# Patient Record
Sex: FEMALE | Race: WHITE | NOT HISPANIC OR LATINO | Employment: OTHER | URBAN - METROPOLITAN AREA
[De-identification: names, ages, dates, MRNs, and addresses within clinical notes are randomized per-mention and may not be internally consistent; named-entity substitution may affect disease eponyms.]

---

## 2019-06-26 PROBLEM — Z92.89 HISTORY OF CARDIAC MONITORING: Status: ACTIVE | Noted: 2019-06-26

## 2019-06-26 PROBLEM — Z92.89 H/O PULMONARY FUNCTION TESTS: Status: ACTIVE | Noted: 2019-06-26

## 2019-06-26 PROBLEM — Z98.890 HISTORY OF CARDIOVERSION: Status: ACTIVE | Noted: 2019-06-26

## 2019-06-26 PROBLEM — R00.2 PALPITATIONS: Status: ACTIVE | Noted: 2019-06-26

## 2019-06-26 PROBLEM — R42 ORTHOSTATIC DIZZINESS: Status: ACTIVE | Noted: 2019-06-26

## 2019-06-26 PROBLEM — R07.89 CHEST PAIN, ATYPICAL: Status: ACTIVE | Noted: 2019-06-26

## 2019-06-26 PROBLEM — I34.0 MITRAL VALVE REGURGITATION: Status: ACTIVE | Noted: 2019-06-26

## 2019-06-26 PROBLEM — Z98.890 HX OF CARDIAC CATHETERIZATION: Status: ACTIVE | Noted: 2019-06-26

## 2019-06-26 PROBLEM — Z92.89 HISTORY OF CARDIOVERSION: Status: ACTIVE | Noted: 2019-06-26

## 2019-06-26 PROBLEM — E66.9 OBESITY: Status: ACTIVE | Noted: 2019-06-26

## 2019-06-26 PROBLEM — Z92.89 HISTORY OF ECHOCARDIOGRAM: Status: ACTIVE | Noted: 2019-06-26

## 2019-06-27 ENCOUNTER — OFFICE VISIT (OUTPATIENT)
Dept: CARDIOLOGY | Facility: CLINIC | Age: 64
End: 2019-06-27

## 2019-06-27 VITALS
WEIGHT: 233 LBS | BODY MASS INDEX: 37.45 KG/M2 | SYSTOLIC BLOOD PRESSURE: 110 MMHG | OXYGEN SATURATION: 96 % | HEART RATE: 100 BPM | DIASTOLIC BLOOD PRESSURE: 68 MMHG | HEIGHT: 66 IN

## 2019-06-27 DIAGNOSIS — I48.91 ATRIAL FIBRILLATION, UNSPECIFIED TYPE (HCC): Primary | ICD-10-CM

## 2019-06-27 DIAGNOSIS — E66.09 CLASS 2 OBESITY DUE TO EXCESS CALORIES WITHOUT SERIOUS COMORBIDITY WITH BODY MASS INDEX (BMI) OF 37.0 TO 37.9 IN ADULT: ICD-10-CM

## 2019-06-27 DIAGNOSIS — R00.2 PALPITATIONS: ICD-10-CM

## 2019-06-27 DIAGNOSIS — I34.0 MITRAL VALVE INSUFFICIENCY, UNSPECIFIED ETIOLOGY: ICD-10-CM

## 2019-06-27 PROCEDURE — 99214 OFFICE O/P EST MOD 30 MIN: CPT | Performed by: INTERNAL MEDICINE

## 2019-06-27 RX ORDER — BUDESONIDE AND FORMOTEROL FUMARATE DIHYDRATE 160; 4.5 UG/1; UG/1
2 AEROSOL RESPIRATORY (INHALATION) AS NEEDED
COMMUNITY
Start: 2019-04-26

## 2019-06-27 RX ORDER — LEVETIRACETAM 500 MG/1
1000 TABLET ORAL EVERY EVENING
Refills: 3 | COMMUNITY
Start: 2019-04-18

## 2019-06-27 NOTE — PROGRESS NOTES
[facility] SPECIALISTS        Subjective:     Encounter Date:06/27/2019      Patient ID: Meena Cesar is a 63 y.o. female.      HPI: I saw Meena Cesar today for continued cardiac care.  She is a pleasant 63-year-old female who is maintained her activity level.  She denies chest pain pressure tightness she does have some dyspnea on exertion but this is not progressive.  She is free of syncope near syncope but does report occasional palpitations.  These are moderately symptomatic.  On ambulatory EKG from 2/21/2019 she demonstrated atrial flutter unroofed percent burden ranging from 49 to 213 bpm with average of 88 bpm.  She is undergone coronary angiography in 2016 demonstrating normal left ventricular function normal coronary arteries.  Echocardiogram February this year reveals preserved left ventricular function mild concentric left ventricular hypertrophy mild mitral tricuspid insufficiency.  She is obtain reasonable control of her atrial flutter and fibrillation with amiodarone and beta-blocker therapy.  She is on long-term anticoagulation with Coumadin.      The following portions of the patient's history were reviewed and updated as appropriate: allergies, current medications, past family history, past medical history, past social history, past surgical history and problem list.    Problem List:  Patient Active Problem List   Diagnosis   • A-fib (CMS/[facility])   • Obesity   • Mitral valve regurgitation   • Orthostatic dizziness   • Chest pain, atypical   • Palpitations   • History of cardioversion   • History of echocardiogram   • H/O pulmonary function tests   • Hx of cardiac catheterization   • History of cardiac monitoring       Past Medical History:  No past medical history on file.    Past Surgical History:  No past surgical history on file.    Social History:  Social History     Socioeconomic History   • Marital status:      Spouse name: Not on file   • Number of children: Not on file  "  • Years of education: Not on file   • Highest education level: Not on file   Tobacco Use   • Smoking status: Never Smoker       Allergies:  No Known Allergies      ROS:  Review of Systems   Constitution: Negative for weakness and malaise/fatigue.   HENT: Negative for hearing loss and nosebleeds.    Eyes: Negative for double vision and visual disturbance.   Cardiovascular: Positive for dyspnea on exertion and palpitations. Negative for chest pain, claudication, near-syncope, orthopnea, paroxysmal nocturnal dyspnea and syncope.   Respiratory: Negative for cough, shortness of breath, sleep disturbances due to breathing, snoring, sputum production and wheezing.    Endocrine: Negative for cold intolerance, heat intolerance, polydipsia and polyuria.   Hematologic/Lymphatic: Negative for adenopathy and bleeding problem. Does not bruise/bleed easily.   Skin: Negative for flushing and itching.   Musculoskeletal: Negative for back pain, falls, joint pain, joint swelling, muscle weakness and neck pain.   Gastrointestinal: Negative for abdominal pain, dysphagia, heartburn, nausea and vomiting.   Genitourinary: Negative for dysuria and frequency.   Neurological: Negative for disturbances in coordination, dizziness, light-headedness, loss of balance and numbness.   Psychiatric/Behavioral: Negative for altered mental status and memory loss. The patient is not nervous/anxious.    Allergic/Immunologic: Negative for hives and persistent infections.          Objective:         /68 (BP Location: Right arm, Patient Position: Sitting, Cuff Size: Large Adult)   Pulse 100   Ht 167.6 cm (66\")   Wt 106 kg (233 lb)   SpO2 96%   BMI 37.61 kg/m²     Physical Exam   Constitutional: She is oriented to person, place, and time. She appears well-developed and well-nourished.   HENT:   Head: Normocephalic and atraumatic.   Eyes: Conjunctivae and EOM are normal. Pupils are equal, round, and reactive to light.   Neck: Neck supple. No " thyromegaly present.   Cardiovascular: Normal rate, regular rhythm, S1 normal, S2 normal and intact distal pulses. PMI is not displaced. Exam reveals gallop and S4. Exam reveals no S3, no distant heart sounds, no friction rub, no midsystolic click and no opening snap.   No murmur heard.  Pulses:       Carotid pulses are 2+ on the right side, and 2+ on the left side.       Radial pulses are 2+ on the right side, and 2+ on the left side.        Femoral pulses are 2+ on the right side, and 2+ on the left side.       Dorsalis pedis pulses are 2+ on the right side, and 2+ on the left side.        Posterior tibial pulses are 2+ on the right side, and 2+ on the left side.   1/6 systolic murmur left sternal border   Pulmonary/Chest: Effort normal and breath sounds normal. No respiratory distress. She has no wheezes. She has no rales.   Abdominal: Soft. Bowel sounds are normal. She exhibits no distension and no mass. There is no tenderness.   Musculoskeletal: Normal range of motion. She exhibits no edema.   Neurological: She is alert and oriented to person, place, and time.   Skin: Skin is warm and dry. No erythema.   Psychiatric: She has a normal mood and affect.       In-Office Procedure(s):  Procedures    ASCVD RIsk Score::  The ASCVD Risk score ([place] [facility]E Jr., et al., 2013) failed to calculate for the following reasons:    Cannot find a previous HDL lab    Cannot find a previous total cholesterol lab        Assessment/Plan:         1. Atrial fibrillation flutter/, unspecified type (CMS/HCC)  Moderately symptomatic on chronic amiodarone and anticoagulation    2. Mitral valve insufficiency, unspecified etiology  Compensated    3. Palpitations  Moderately symptomatic    4. Class 2 obesity due to excess calories without serious comorbidity with body mass index (BMI) of 37.0 to 37.9 in adult  Encouraged low-cholesterol low saturated fat weight reduction diet and increase activity level    I feel she will is free of angina and  appears near euvolemic.  She is I feel moderately symptomatic from her atrial paroxysmal dysrhythmias.  She requires amiodarone for control on a chronic basis.  She is tolerating long-term anticoagulation.  I strongly recommended to her she consider ablation to reduce her symptoms and potentially discontinue amiodarone.  She is agreeable and I will make arrangements for her to see Dr. Geoff Calle, electrophysiologist.  I spent greater than 30 minutes with face-to-face time with Ms. Cesar, of which greater than 50% was in counseling in regards to the above.  I will see her in follow-up within 3 months         Ajay Hathaway MD  06/27/19  .

## 2019-07-11 ENCOUNTER — TELEPHONE (OUTPATIENT)
Dept: CARDIOLOGY | Facility: CLINIC | Age: 64
End: 2019-07-11

## 2019-07-11 ENCOUNTER — OFFICE VISIT (OUTPATIENT)
Dept: CARDIOLOGY | Facility: CLINIC | Age: 64
End: 2019-07-11

## 2019-07-11 VITALS
DIASTOLIC BLOOD PRESSURE: 89 MMHG | HEIGHT: 66 IN | BODY MASS INDEX: 38.18 KG/M2 | SYSTOLIC BLOOD PRESSURE: 115 MMHG | WEIGHT: 237.6 LBS

## 2019-07-11 DIAGNOSIS — I48.19 PERSISTENT ATRIAL FIBRILLATION (HCC): Primary | ICD-10-CM

## 2019-07-11 DIAGNOSIS — I48.92 ATRIAL FLUTTER WITH CONTROLLED RESPONSE (HCC): ICD-10-CM

## 2019-07-11 PROCEDURE — 99204 OFFICE O/P NEW MOD 45 MIN: CPT | Performed by: INTERNAL MEDICINE

## 2019-07-11 NOTE — PROGRESS NOTES
Subjective:     Encounter Date:07/11/2019      Patient ID: Meena Cesar is a 63 y.o. female.    Chief Complaint:  Chief Complaint   Patient presents with   • Follow-up     possbile atrial flutter ablation       HPI:  Patient is referred for evaluation of atrial fibrillation and flutter.    Ms Cesar is a 62 yo patient of Dr. Ajay Hathaway who has a past medical history significant for mild mitral regurgitation.  She has had atrial fibrillation for about 3-4 years and has been on amiodarone since then.  Within the past several months she has noted an increase in her palpitations which she describes as an uncomfortable feeling in her chest associated with dyspnea and fatigue.  Her symptoms are now a daily occurrence.  Her dyspnea is relieved with rest.  There is no associated chest pain, PND, orthopnea or peripheral edema.    She was found to be in atrial flutter on her EKG last month and an ambulatory EKG from 2/19 showed atrial flutter with rates ranging between 49-213bpm.    A cath in 22016 showed normal coronaries and an echo 2/19 showed mild concentric LVH with an EF of 55% and mild MR.    She is concerned about long term effects of chronic amiodarone use.      The following portions of the patient's history were reviewed and updated as appropriate: allergies, current medications, past family history, past medical history, past social history, past surgical history and problem list.    Problem List:  Patient Active Problem List   Diagnosis   • A-fib (CMS/HCC)   • Obesity   • Mitral valve regurgitation   • Orthostatic dizziness   • Chest pain, atypical   • Palpitations   • History of cardioversion   • History of echocardiogram   • H/O pulmonary function tests   • Hx of cardiac catheterization   • History of cardiac monitoring   • Atrial flutter with controlled response (CMS/HCC)       Past Medical History:  History reviewed. No pertinent past medical history.    Past Surgical History:  History  "reviewed. No pertinent surgical history.    Social History:  Social History     Socioeconomic History   • Marital status:      Spouse name: Not on file   • Number of children: Not on file   • Years of education: Not on file   • Highest education level: Not on file   Tobacco Use   • Smoking status: Never Smoker   • Smokeless tobacco: Never Used   Substance and Sexual Activity   • Alcohol use: No     Frequency: Never       Allergies:  No Known Allergies    Immunizations:    There is no immunization history on file for this patient.    ROS:  Review of Systems   Constitution: Positive for malaise/fatigue.   HENT: Negative.    Eyes: Negative.    Cardiovascular: Positive for dyspnea on exertion, irregular heartbeat and palpitations. Negative for chest pain and orthopnea.   Respiratory: Positive for shortness of breath.    Endocrine: Negative.    Hematologic/Lymphatic: Negative.    Skin: Negative.    Musculoskeletal: Positive for arthritis and joint pain.   Gastrointestinal: Negative.    Genitourinary: Negative.    Neurological: Negative.    Psychiatric/Behavioral: Negative.    Allergic/Immunologic: Negative.           Objective:         /89   Ht 167.6 cm (66\")   Wt 108 kg (237 lb 9.6 oz)   BMI 38.35 kg/m²     Physical Exam   Constitutional: She is oriented to person, place, and time. She appears well-developed and well-nourished. No distress.   HENT:   Head: Normocephalic and atraumatic.   Eyes: Conjunctivae and EOM are normal. Pupils are equal, round, and reactive to light. No scleral icterus.   Neck: Normal range of motion. No thyromegaly present.   Cardiovascular: Normal rate, regular rhythm and normal heart sounds.   Pulmonary/Chest: Effort normal and breath sounds normal.   Abdominal: Soft. Bowel sounds are normal.   Musculoskeletal: Normal range of motion.   Neurological: She is alert and oriented to person, place, and time.   Skin: Skin is warm and dry.   Psychiatric: She has a normal mood and " affect.       In-Office Procedure(s):    ECG 12 Lead  Date/Time: 7/14/2019 7:56 AM  Performed by: Gary Calle MD  Authorized by: Gary Calle MD   Comparison: not compared with previous ECG   Previous ECG: no previous ECG available  Rhythm: atrial flutter  Rate: normal    Clinical impression: abnormal EKG            ASCVD RIsk Score::  The ASCVD Risk score ([place]oe Bayflavio MCCLOUD Jr., et al., 2013) failed to calculate for the following reasons:    Cannot find a previous HDL lab    Cannot find a previous total cholesterol lab    Recent Radiology:  Imaging Results (most recent)     None          Lab Review:   No visits with results within 6 Month(s) from this visit.   Latest known visit with results is:   No results found for any previous visit.              Invalid input(s): ALKPO4                        Invalid input(s): LDLCALC                Assessment:          Diagnosis Plan   1. Persistent atrial fibrillation (CMS/HCC)     2. Atrial flutter with controlled response (CMS/HCC)            Plan:         1. Atrial flutter and atrial fibrillation - currently she is in flutter with symptoms of dyspnea, fatigue and poor endurance.  Discussed options of alternative antiarrhythmic drug therapy with dofetilide vs ablation with the associated risks and benefits of each.  She is leaning toward ablation but would like to give it further consideration.  She will call us when she has made her decision.    Level of Care:                 Gary Calle MD  07/11/19  .

## 2019-07-14 PROBLEM — I48.92 ATRIAL FLUTTER WITH CONTROLLED RESPONSE (HCC): Status: ACTIVE | Noted: 2019-07-14

## 2019-07-14 PROCEDURE — 93000 ELECTROCARDIOGRAM COMPLETE: CPT | Performed by: INTERNAL MEDICINE

## 2019-07-16 DIAGNOSIS — I48.91 ATRIAL FIBRILLATION, UNSPECIFIED TYPE (HCC): Primary | ICD-10-CM

## 2019-08-05 ENCOUNTER — TELEPHONE (OUTPATIENT)
Dept: CARDIOLOGY | Facility: CLINIC | Age: 64
End: 2019-08-05

## 2019-08-05 NOTE — TELEPHONE ENCOUNTER
Called pt. To give her date and time for her a-fib ablation. She states that after thinking about it she has decided not to have this done. I told her that if she changes her mind to please give me a call,

## 2019-09-26 ENCOUNTER — OFFICE VISIT (OUTPATIENT)
Dept: CARDIOLOGY | Facility: CLINIC | Age: 64
End: 2019-09-26

## 2019-09-26 VITALS
DIASTOLIC BLOOD PRESSURE: 64 MMHG | HEART RATE: 96 BPM | OXYGEN SATURATION: 95 % | SYSTOLIC BLOOD PRESSURE: 96 MMHG | WEIGHT: 237 LBS | BODY MASS INDEX: 38.09 KG/M2 | HEIGHT: 66 IN

## 2019-09-26 DIAGNOSIS — E66.09 CLASS 2 OBESITY DUE TO EXCESS CALORIES WITHOUT SERIOUS COMORBIDITY WITH BODY MASS INDEX (BMI) OF 37.0 TO 37.9 IN ADULT: ICD-10-CM

## 2019-09-26 DIAGNOSIS — R00.2 PALPITATIONS: ICD-10-CM

## 2019-09-26 DIAGNOSIS — R07.89 CHEST PAIN, ATYPICAL: ICD-10-CM

## 2019-09-26 DIAGNOSIS — I48.0 PAROXYSMAL ATRIAL FIBRILLATION (HCC): ICD-10-CM

## 2019-09-26 DIAGNOSIS — Z79.01 CURRENT USE OF LONG TERM ANTICOAGULATION: ICD-10-CM

## 2019-09-26 DIAGNOSIS — I48.92 ATRIAL FLUTTER WITH CONTROLLED RESPONSE (HCC): Primary | ICD-10-CM

## 2019-09-26 DIAGNOSIS — I34.0 MITRAL VALVE INSUFFICIENCY, UNSPECIFIED ETIOLOGY: ICD-10-CM

## 2019-09-26 PROCEDURE — 99214 OFFICE O/P EST MOD 30 MIN: CPT | Performed by: INTERNAL MEDICINE

## 2019-09-26 NOTE — PROGRESS NOTES
\[facility]\"" [facility]        Subjective:     Encounter Date:09/26/2019      Patient ID: Meena Cesar is a 64 y.o. female.      HPI: I saw Meena Cesar today for continued cardiovascular care.  Ms. Cesar is a pleasant 63-year-old female who is in good spirits.  She denies chest pain pressure tightness suggestive of angina.  Her dyspnea on exertion remains stable there is no orthopnea or PND no syncope or near syncope.  She reports occasional palpitations which overall are well-tolerated.  She is known to have atrial flutter and fibrillation and remains on amiodarone but beta-blocker therapy and long-term anticoagulation she underwent evaluation by Dr. Geoff Calle for antiarrhythmic adjustment or ablation.  At present time she feels her symptoms are minimal and well-tolerated, she wishes to continue her current regimen.      The following portions of the patient's history were reviewed and updated as appropriate: allergies, current medications, past family history, past medical history, past social history, past surgical history and problem list.    Problem List:  Patient Active Problem List   Diagnosis   • A-fib (CMS/[facility])   • Obesity   • Mitral valve regurgitation   • Orthostatic dizziness   • Chest pain, atypical   • Palpitations   • History of cardioversion   • History of echocardiogram   • H/O pulmonary function tests   • Hx of cardiac catheterization   • History of cardiac monitoring   • Atrial flutter with controlled response (CMS/[facility])   • Current use of long term anticoagulation       Past Medical History:  No past medical history on file.    Past Surgical History:  No past surgical history on file.    Social History:  Social History     Socioeconomic History   • Marital status:      Spouse name: Not on file   • Number of children: Not on file   • Years of education: Not on file   • Highest education level: Not on file   Tobacco Use   • Smoking status: Never Smoker   • Smokeless  "tobacco: Never Used   Substance and Sexual Activity   • Alcohol use: No     Frequency: Never       Allergies:  No Known Allergies      ROS:  Review of Systems   Constitution: Negative for weakness and malaise/fatigue.   HENT: Negative for hearing loss and nosebleeds.    Eyes: Negative for double vision and visual disturbance.   Cardiovascular: Positive for dyspnea on exertion and palpitations. Negative for chest pain, claudication, near-syncope, orthopnea, paroxysmal nocturnal dyspnea and syncope.   Respiratory: Negative for cough, shortness of breath, sleep disturbances due to breathing, snoring, sputum production and wheezing.    Endocrine: Negative for cold intolerance, heat intolerance, polydipsia and polyuria.   Hematologic/Lymphatic: Negative for adenopathy and bleeding problem. Does not bruise/bleed easily.   Skin: Negative for flushing and itching.   Musculoskeletal: Negative for back pain, falls, joint pain, joint swelling, muscle weakness and neck pain.   Gastrointestinal: Negative for abdominal pain, dysphagia, heartburn, nausea and vomiting.   Genitourinary: Negative for dysuria and frequency.   Neurological: Negative for disturbances in coordination, dizziness, light-headedness, loss of balance and numbness.   Psychiatric/Behavioral: Negative for altered mental status and memory loss. The patient is not nervous/anxious.    Allergic/Immunologic: Negative for hives and persistent infections.          Objective:         BP 96/64 (BP Location: Right arm, Patient Position: Sitting, Cuff Size: Large Adult)   Pulse 96   Ht 167.6 cm (66\")   Wt 108 kg (237 lb)   SpO2 95%   BMI 38.25 kg/m²     Physical Exam   Constitutional: She is oriented to person, place, and time. She appears well-developed and well-nourished.   HENT:   Head: Normocephalic and atraumatic.   Eyes: Conjunctivae and EOM are normal. Pupils are equal, round, and reactive to light.   Neck: Neck supple. No thyromegaly present.   Cardiovascular: " Normal rate, regular rhythm, S1 normal, S2 normal and intact distal pulses. PMI is not displaced. Exam reveals gallop and S4. Exam reveals no S3, no distant heart sounds, no friction rub, no midsystolic click and no opening snap.   No murmur heard.  Pulses:       Carotid pulses are 2+ on the right side, and 2+ on the left side.       Radial pulses are 2+ on the right side, and 2+ on the left side.        Femoral pulses are 2+ on the right side, and 2+ on the left side.       Dorsalis pedis pulses are 2+ on the right side, and 2+ on the left side.        Posterior tibial pulses are 2+ on the right side, and 2+ on the left side.   1/6 systolic murmur left sternal border   Pulmonary/Chest: Effort normal and breath sounds normal. No respiratory distress. She has no wheezes. She has no rales.   Abdominal: Soft. Bowel sounds are normal. She exhibits no distension and no mass. There is no tenderness.   Musculoskeletal: Normal range of motion. She exhibits no edema.   Neurological: She is alert and oriented to person, place, and time.   Skin: Skin is warm and dry. No erythema.   Psychiatric: She has a normal mood and affect.       In-Office Procedure(s):  Procedures    ASCVD RIsk Score::  The ASCVD Risk score ([place] [facility] Jr., et al., 2013) failed to calculate for the following reasons:    Cannot find a previous HDL lab    Cannot find a previous total cholesterol lab        Assessment/Plan:         1. Atrial flutter with controlled response (CMS/HCC)  Tolerable    2. Paroxysmal atrial fibrillation (CMS/HCC)  Stable    3. Palpitations  Tolerable    4. Current use of long term anticoagulation  Well-tolerated    5. Mitral valve insufficiency, unspecified etiology  Stable    6. Class 2 obesity due to excess calories without serious comorbidity with body mass index (BMI) of 37.0 to 37.9 in adult  Persistent    7. Chest pain, atypical  Resolved    Ms. Cesar is stable with minimal symptoms secondary to her atrial  flutter/fibrillation.  As indicated above she is decided to continue her current medical regimen.  Therefore may no changes.  Of encouraged her to advance her activity level and observe a low-cholesterol low saturated fat weight reduction diet.  I will see her in follow-up in 3 months sooner if needed.  She will contact me if there is any increase in the frequency durations or symptoms associated with her atrial dysrhythmias.           Ajay Hathaway MD  09/26/19  .

## 2020-01-16 ENCOUNTER — OFFICE VISIT (OUTPATIENT)
Dept: CARDIOLOGY | Facility: CLINIC | Age: 65
End: 2020-01-16

## 2020-01-16 VITALS
SYSTOLIC BLOOD PRESSURE: 110 MMHG | WEIGHT: 242.7 LBS | DIASTOLIC BLOOD PRESSURE: 78 MMHG | HEART RATE: 94 BPM | HEIGHT: 66 IN | OXYGEN SATURATION: 99 % | BODY MASS INDEX: 39.01 KG/M2

## 2020-01-16 DIAGNOSIS — I48.92 ATRIAL FLUTTER WITH CONTROLLED RESPONSE (HCC): ICD-10-CM

## 2020-01-16 DIAGNOSIS — I34.0 MITRAL VALVE INSUFFICIENCY, UNSPECIFIED ETIOLOGY: ICD-10-CM

## 2020-01-16 DIAGNOSIS — E66.09 CLASS 2 OBESITY DUE TO EXCESS CALORIES WITHOUT SERIOUS COMORBIDITY WITH BODY MASS INDEX (BMI) OF 37.0 TO 37.9 IN ADULT: ICD-10-CM

## 2020-01-16 DIAGNOSIS — I48.0 PAROXYSMAL ATRIAL FIBRILLATION (HCC): Primary | ICD-10-CM

## 2020-01-16 DIAGNOSIS — R00.2 PALPITATIONS: ICD-10-CM

## 2020-01-16 DIAGNOSIS — Z79.01 CURRENT USE OF LONG TERM ANTICOAGULATION: ICD-10-CM

## 2020-01-16 PROCEDURE — 99213 OFFICE O/P EST LOW 20 MIN: CPT | Performed by: INTERNAL MEDICINE

## 2020-01-18 NOTE — PROGRESS NOTES
[facility]ART SPECIAL[facility]        Subjective:     Encounter Date:01/16/2020      Patient ID: Meena Cesar is a 64 y.o. female.      HPI: I saw Meena Cesar today for cardiovascular care.  She is a very pleasant 64-year-old female with a decreased activity level secondary to right knee pain.  She uses a cane for ambulation.  She reports rare palpitations.  She has a history of atrial flutter and fibrillation and remains on long-term amiodarone with Coumadin anticoagulation.  She otherwise feels well free of chest pain pressure tightness.  No significant dyspnea on exertion.  She remains free of orthopnea or PND syncope or near syncope.  She tolerates her anticoagulation well no significant bruising or bleeding.  Overall she is doing well feels well remains active.    Lipids are monitored by Dr. Martinez and remain well controlled.      The following portions of the patient's history were reviewed and updated as appropriate: allergies, current medications, past family history, past medical history, past social history, past surgical history and problem list.    Problem List:  Patient Active Problem List   Diagnosis   • A-fib (CMS/[facility])   • Obesity   • Mitral valve regurgitation   • Orthostatic dizziness   • Chest pain, atypical   • Palpitations   • History of cardioversion   • History of echocardiogram   • H/O pulmonary function tests   • Hx of cardiac catheterization   • History of cardiac monitoring   • Atrial flutter with controlled response (CMS/[facility])   • Current use of long term anticoagulation       Past Medical History:  No past medical history on file.    Past Surgical History:  No past surgical history on file.    Social History:  Social History     Socioeconomic History   • Marital status:      Spouse name: Not on file   • Number of children: Not on file   • Years of education: Not on file   • Highest education level: Not on file   Tobacco Use   • Smoking status: Never Smoker   • Smokeless  "tobacco: Never Used   Substance and Sexual Activity   • Alcohol use: No     Frequency: Never       Allergies:  No Known Allergies      ROS:  Review of Systems   Constitution: Negative for malaise/fatigue.   HENT: Negative for hearing loss and nosebleeds.    Eyes: Negative for double vision and visual disturbance.   Cardiovascular: Positive for palpitations. Negative for chest pain, claudication, dyspnea on exertion, near-syncope, orthopnea, paroxysmal nocturnal dyspnea and syncope.   Respiratory: Negative for cough, shortness of breath, sleep disturbances due to breathing, snoring, sputum production and wheezing.    Endocrine: Negative for cold intolerance, heat intolerance, polydipsia and polyuria.   Hematologic/Lymphatic: Negative for adenopathy and bleeding problem. Does not bruise/bleed easily.   Skin: Negative for flushing and itching.   Musculoskeletal: Negative for back pain, falls, joint pain, joint swelling, muscle weakness and neck pain.   Gastrointestinal: Negative for abdominal pain, dysphagia, heartburn, nausea and vomiting.   Genitourinary: Negative for dysuria and frequency.   Neurological: Negative for disturbances in coordination, dizziness, light-headedness, loss of balance, numbness and weakness.   Psychiatric/Behavioral: Negative for altered mental status and memory loss. The patient is not nervous/anxious.    Allergic/Immunologic: Negative for hives and persistent infections.          Objective:         /78 (BP Location: Right arm, Patient Position: Sitting, Cuff Size: Large Adult)   Pulse 94   Ht 167.6 cm (66\")   Wt 110 kg (242 lb 11.2 oz)   SpO2 99%   BMI 39.17 kg/m²     Physical Exam   Constitutional: She is oriented to person, place, and time. She appears well-developed and well-nourished.   HENT:   Head: Normocephalic and atraumatic.   Eyes: Pupils are equal, round, and reactive to light. Conjunctivae and EOM are normal.   Neck: Neck supple. No thyromegaly present. "   Cardiovascular: Normal rate, regular rhythm, S1 normal, S2 normal and intact distal pulses. PMI is not displaced. Exam reveals gallop and S4. Exam reveals no S3, no distant heart sounds, no friction rub, no midsystolic click and no opening snap.   No murmur heard.  Pulses:       Carotid pulses are 2+ on the right side, and 2+ on the left side.       Radial pulses are 2+ on the right side, and 2+ on the left side.        Femoral pulses are 2+ on the right side, and 2+ on the left side.       Dorsalis pedis pulses are 2+ on the right side, and 2+ on the left side.        Posterior tibial pulses are 2+ on the right side, and 2+ on the left side.   1/6 systolic murmur left sternal border   Pulmonary/Chest: Effort normal and breath sounds normal. No respiratory distress. She has no wheezes. She has no rales.   Abdominal: Soft. Bowel sounds are normal. She exhibits no distension and no mass. There is no tenderness.   Musculoskeletal: Normal range of motion. She exhibits no edema.   Neurological: She is alert and oriented to person, place, and time.   Skin: Skin is warm and dry. No erythema.   Psychiatric: She has a normal mood and affect.       In-Office Procedure(s):  Procedures    ASCVD RIsk Score::  The ASCVD Risk score (Sintia DC Jr., et al., 2013) failed to calculate for the following reasons:    Cannot find a previous HDL lab    Cannot find a previous total cholesterol lab        Assessment/Plan:         1. Paroxysmal atrial fibrillation (CMS/HCC)  Stable    2. Atrial flutter with controlled response (CMS/HCC)  Stable     3. Current use of long term anticoagulation  Well-tolerated    4. Palpitations  Infrequent    5. Mitral valve insufficiency, unspecified etiology  Compensated I know her are all good    6. Class 2 obesity due to excess calories without serious comorbidity with body mass index (BMI) of 37.0 to 37.9 in adult  Exercise program and weight reduction    Ms. Cesar is stable from the cardiovascular  standpoint.  She enjoys a good activity level free of angina rare palpitations and no significant dyspnea on exertion.  Have encouraged her to observe a low-cholesterol low saturated fat weight reduction diet.  Have encouraged her to advance her aerobic exercise.  We will monitor her amiodarone treatment.  I will see her in follow-up in 6 months.       Ajay Hathaway MD  01/18/20  .

## 2020-01-22 ENCOUNTER — TELEPHONE (OUTPATIENT)
Dept: CARDIOLOGY | Facility: CLINIC | Age: 65
End: 2020-01-22

## 2020-01-22 DIAGNOSIS — Z79.899 LONG TERM CURRENT USE OF AMIODARONE: ICD-10-CM

## 2020-01-22 DIAGNOSIS — I48.0 PAROXYSMAL ATRIAL FIBRILLATION (HCC): Primary | ICD-10-CM

## 2020-01-22 NOTE — TELEPHONE ENCOUNTER
RE: Amiodarone Maintenance  EK19  Thyroid studies: 19  Liver studies: 19  Eye exam: 2019  Digoxin & PT/INR: n/a    I spoke with pt. She will be due for repeat labs in 2020. EKG & eye exam due 2020. Orders for labs mailed to pt to complete at that time.  RTRN

## 2020-04-06 RX ORDER — AMIODARONE HYDROCHLORIDE 200 MG/1
TABLET ORAL
Qty: 90 TABLET | Refills: 1 | Status: SHIPPED | OUTPATIENT
Start: 2020-04-06 | End: 2020-10-01

## 2020-05-27 ENCOUNTER — RESULTS ENCOUNTER (OUTPATIENT)
Dept: CARDIOLOGY | Facility: CLINIC | Age: 65
End: 2020-05-27

## 2020-05-27 DIAGNOSIS — I48.0 PAROXYSMAL ATRIAL FIBRILLATION (HCC): ICD-10-CM

## 2020-05-27 DIAGNOSIS — Z79.899 LONG TERM CURRENT USE OF AMIODARONE: ICD-10-CM

## 2020-06-28 LAB
ALBUMIN SERPL-MCNC: 3.5 G/DL (ref 3.8–4.8)
ALP SERPL-CCNC: 115 IU/L (ref 39–117)
ALT SERPL-CCNC: 14 IU/L (ref 0–32)
AST SERPL-CCNC: 16 IU/L (ref 0–40)
BILIRUB DIRECT SERPL-MCNC: 0.08 MG/DL (ref 0–0.4)
BILIRUB SERPL-MCNC: 0.3 MG/DL (ref 0–1.2)
PROT SERPL-MCNC: 6.7 G/DL (ref 6–8.5)
T3 SERPL-MCNC: 111 NG/DL (ref 71–180)
T4 FREE SERPL-MCNC: 1.21 NG/DL (ref 0.82–1.77)
T4 SERPL-MCNC: 7.3 UG/DL (ref 4.5–12)
TSH SERPL DL<=0.005 MIU/L-ACNC: 3.01 UIU/ML (ref 0.45–4.5)

## 2020-07-16 ENCOUNTER — OFFICE VISIT (OUTPATIENT)
Dept: CARDIOLOGY | Facility: CLINIC | Age: 65
End: 2020-07-16

## 2020-07-16 VITALS
WEIGHT: 240 LBS | BODY MASS INDEX: 38.57 KG/M2 | DIASTOLIC BLOOD PRESSURE: 64 MMHG | HEART RATE: 83 BPM | SYSTOLIC BLOOD PRESSURE: 94 MMHG | HEIGHT: 66 IN

## 2020-07-16 DIAGNOSIS — Z79.01 CURRENT USE OF LONG TERM ANTICOAGULATION: ICD-10-CM

## 2020-07-16 DIAGNOSIS — I48.92 ATRIAL FLUTTER WITH CONTROLLED RESPONSE (HCC): ICD-10-CM

## 2020-07-16 DIAGNOSIS — I34.0 MITRAL VALVE INSUFFICIENCY, UNSPECIFIED ETIOLOGY: ICD-10-CM

## 2020-07-16 DIAGNOSIS — I48.0 PAROXYSMAL ATRIAL FIBRILLATION (HCC): Primary | ICD-10-CM

## 2020-07-16 PROCEDURE — 99214 OFFICE O/P EST MOD 30 MIN: CPT | Performed by: INTERNAL MEDICINE

## 2020-07-16 NOTE — PROGRESS NOTES
[facility] SPECIAL[facility]        Subjective:     Encounter Date:07/16/2020      Patient ID: Meena Cesar is a 64 y.o. female.      HPI: I saw Meena Cesar today for cardiovascular care.  She is observing the CDC guidelines for social distancing.  She reports a history starting February of this year which includes fever up to 101 degrees, sneezing, dry cough and occasional nausea.  She denies chest pain pressure or tightness.  She does have her baseline dyspnea on exertion but this is nonprogressive.  She sleeps with one pillow free of orthopnea or PND and no significant lower extremity edema.  She denies syncope or near syncope.  She specifically denies any orthostatic dizziness.  Her blood pressure is running 94/64 in the office today.  She has a history of atrial flutter and fibrillation.  She has been maintained on long-term amiodarone and Coumadin anticoagulation.  She tolerates her anticoagulation well no reported significant bruising or bleeding.  Echocardiogram obtained 2/21/2019 revealed preserved left ventricular function, mild concentric left ventricular hypertrophy, mild mitral and tricuspid insufficiency.      The following portions of the patient's history were reviewed and updated as appropriate: allergies, current medications, past family history, past medical history, past social history, past surgical history and problem list.    Problem List:  Patient Active Problem List   Diagnosis   • A-fib (CMS/HCC)   • Obesity   • Mitral valve regurgitation   • Orthostatic dizziness   • Chest pain, atypical   • Palpitations   • History of cardioversion   • History of echocardiogram   • H/O pulmonary function tests   • Hx of cardiac catheterization   • History of cardiac monitoring   • Atrial flutter with controlled response (CMS/HCC)   • Current use of long term anticoagulation       Past Medical History:  No past medical history on file.    Past Surgical History:  No past surgical history on  "file.    Social History:  Social History     Socioeconomic History   • Marital status:      Spouse name: Not on file   • Number of children: Not on file   • Years of education: Not on file   • Highest education level: Not on file   Tobacco Use   • Smoking status: Never Smoker   • Smokeless tobacco: Never Used   Substance and Sexual Activity   • Alcohol use: No     Frequency: Never       Allergies:  No Known Allergies      ROS:  Review of Systems   Constitution: Positive for fever. Negative for malaise/fatigue.   HENT: Negative for hearing loss and nosebleeds.    Eyes: Negative for double vision and visual disturbance.   Cardiovascular: Positive for dyspnea on exertion. Negative for chest pain, claudication, near-syncope, orthopnea, palpitations, paroxysmal nocturnal dyspnea and syncope.   Respiratory: Positive for cough. Negative for shortness of breath, sleep disturbances due to breathing, snoring, sputum production and wheezing.    Endocrine: Negative for cold intolerance, heat intolerance, polydipsia and polyuria.   Hematologic/Lymphatic: Negative for adenopathy and bleeding problem. Does not bruise/bleed easily.   Skin: Negative for flushing and itching.   Musculoskeletal: Negative for back pain, falls, joint pain, joint swelling, muscle weakness and neck pain.   Gastrointestinal: Positive for nausea. Negative for abdominal pain, dysphagia, heartburn and vomiting.   Genitourinary: Negative for dysuria and frequency.   Neurological: Negative for disturbances in coordination, dizziness, light-headedness, loss of balance, numbness and weakness.   Psychiatric/Behavioral: Negative for altered mental status and memory loss. The patient is not nervous/anxious.    Allergic/Immunologic: Negative for hives and persistent infections.          Objective:         BP 94/64   Pulse 83   Ht 167.6 cm (66\")   Wt 109 kg (240 lb)   BMI 38.74 kg/m²     Physical Exam   Constitutional: She is oriented to person, place, and " time. She appears well-developed and well-nourished.   HENT:   Head: Normocephalic and atraumatic.   Eyes: Pupils are equal, round, and reactive to light. Conjunctivae and EOM are normal.   Neck: Neck supple. No thyromegaly present.   Cardiovascular: Normal rate, regular rhythm, S1 normal, S2 normal and intact distal pulses. PMI is not displaced. Exam reveals gallop and S4. Exam reveals no S3, no distant heart sounds, no friction rub, no midsystolic click and no opening snap.   No murmur heard.  Pulses:       Carotid pulses are 2+ on the right side, and 2+ on the left side.       Radial pulses are 2+ on the right side, and 2+ on the left side.        Femoral pulses are 2+ on the right side, and 2+ on the left side.       Dorsalis pedis pulses are 2+ on the right side, and 2+ on the left side.        Posterior tibial pulses are 2+ on the right side, and 2+ on the left side.   1/6 systolic murmur left sternal border   Pulmonary/Chest: Effort normal and breath sounds normal. No respiratory distress. She has no wheezes. She has no rales.   Abdominal: Soft. Bowel sounds are normal. She exhibits no distension and no mass. There is no tenderness.   Musculoskeletal: Normal range of motion. She exhibits no edema.   Neurological: She is alert and oriented to person, place, and time.   Skin: Skin is warm and dry. No erythema.   Psychiatric: She has a normal mood and affect.   No change in today's physical exam    In-Office Procedure(s):  Procedures    ASCVD RIsk Score::  The ASCVD Risk score (Sintia DC Jr., et al., 2013) failed to calculate for the following reasons:    Cannot find a previous HDL lab    Cannot find a previous total cholesterol lab        Assessment/Plan:         1. Paroxysmal atrial fibrillation (CMS/HCC)  Stable    2. Atrial flutter with controlled response (CMS/HCC)  Stable    3. Current use of long term anticoagulation  Continues to be well-tolerated    4. Mitral valve insufficiency, unspecified  etiology  Mild and compensated    5.  Class II obesity  Encourage low-cholesterol low saturated fat weight reduction diet    I feel Ms. Cesar is stable from a cardiovascular standpoint.  She is free of angina and her respiratory status is relatively stable.  She appears to be euvolemic and is tolerating her medications well including her long-term anticoagulation.  I encouraged her to continue her current medical regimen.  In addition I recommended she pursue further evaluation of her symptoms including fever dry cough, nausea and sneezing.  I recommended she obtain COVID-19 test.  I will tentatively plan to see her in follow-up in 6 months of course sooner if needed.       Ajay Hathaway MD  07/16/20  .

## 2020-07-20 ENCOUNTER — TELEPHONE (OUTPATIENT)
Dept: CARDIOLOGY | Facility: CLINIC | Age: 65
End: 2020-07-20

## 2020-07-20 NOTE — TELEPHONE ENCOUNTER
PT CALLED STATING SHE [facility]   [facility] CB# [phone]    MS

## 2020-10-01 DIAGNOSIS — I48.0 PAROXYSMAL ATRIAL FIBRILLATION (HCC): ICD-10-CM

## 2020-10-01 DIAGNOSIS — Z51.81 ENCOUNTER FOR MONITORING AMIODARONE THERAPY: Primary | ICD-10-CM

## 2020-10-01 DIAGNOSIS — Z79.899 ENCOUNTER FOR MONITORING AMIODARONE THERAPY: Primary | ICD-10-CM

## 2020-10-01 RX ORDER — AMIODARONE HYDROCHLORIDE 200 MG/1
TABLET ORAL
Qty: 90 TABLET | Refills: 0 | Status: SHIPPED | OUTPATIENT
Start: 2020-10-01 | End: 2020-12-29

## 2020-10-10 ENCOUNTER — HOSPITAL ENCOUNTER (OUTPATIENT)
Dept: GENERAL RADIOLOGY | Facility: HOSPITAL | Age: 65
Discharge: HOME | End: 2020-10-10
Admitting: INTERNAL MEDICINE

## 2020-10-10 DIAGNOSIS — Z51.81 ENCOUNTER FOR MONITORING AMIODARONE THERAPY: ICD-10-CM

## 2020-10-10 DIAGNOSIS — Z79.899 ENCOUNTER FOR MONITORING AMIODARONE THERAPY: ICD-10-CM

## 2020-10-10 PROCEDURE — 71046 X-RAY EXAM CHEST 2 VIEWS: CPT

## 2020-12-29 DIAGNOSIS — I48.0 PAROXYSMAL ATRIAL FIBRILLATION (HCC): ICD-10-CM

## 2020-12-30 RX ORDER — AMIODARONE HYDROCHLORIDE 200 MG/1
TABLET ORAL
Qty: 90 TABLET | Refills: 1 | Status: SHIPPED | OUTPATIENT
Start: 2020-12-30 | End: 2021-07-06

## 2021-03-17 PROBLEM — E66.9 OBESITY: Chronic | Status: ACTIVE | Noted: 2019-06-26

## 2021-03-17 PROBLEM — I34.0 MITRAL VALVE REGURGITATION: Chronic | Status: ACTIVE | Noted: 2019-06-26

## 2021-03-17 PROBLEM — I48.92 ATRIAL FLUTTER WITH CONTROLLED RESPONSE: Chronic | Status: ACTIVE | Noted: 2019-07-14

## 2021-03-17 PROBLEM — R00.2 PALPITATIONS: Chronic | Status: ACTIVE | Noted: 2019-06-26

## 2021-03-17 PROBLEM — Z79.01 CURRENT USE OF LONG TERM ANTICOAGULATION: Chronic | Status: ACTIVE | Noted: 2019-09-26

## 2021-03-18 ENCOUNTER — TRANSCRIBE ORDERS (OUTPATIENT)
Dept: ADMINISTRATIVE | Facility: HOSPITAL | Age: 66
End: 2021-03-18

## 2021-03-18 ENCOUNTER — OFFICE VISIT (OUTPATIENT)
Dept: CARDIOLOGY | Facility: CLINIC | Age: 66
End: 2021-03-18

## 2021-03-18 VITALS
SYSTOLIC BLOOD PRESSURE: 102 MMHG | HEART RATE: 85 BPM | BODY MASS INDEX: 38.57 KG/M2 | HEIGHT: 66 IN | WEIGHT: 240 LBS | DIASTOLIC BLOOD PRESSURE: 72 MMHG

## 2021-03-18 DIAGNOSIS — Z79.01 CURRENT USE OF LONG TERM ANTICOAGULATION: Chronic | ICD-10-CM

## 2021-03-18 DIAGNOSIS — Z01.818 OTHER SPECIFIED PRE-OPERATIVE EXAMINATION: Primary | ICD-10-CM

## 2021-03-18 DIAGNOSIS — R00.2 PALPITATIONS: Chronic | ICD-10-CM

## 2021-03-18 DIAGNOSIS — I48.0 PAROXYSMAL ATRIAL FIBRILLATION (HCC): Primary | ICD-10-CM

## 2021-03-18 DIAGNOSIS — E66.09 CLASS 2 OBESITY DUE TO EXCESS CALORIES WITHOUT SERIOUS COMORBIDITY WITH BODY MASS INDEX (BMI) OF 37.0 TO 37.9 IN ADULT: ICD-10-CM

## 2021-03-18 DIAGNOSIS — I48.92 ATRIAL FLUTTER WITH CONTROLLED RESPONSE (HCC): Chronic | ICD-10-CM

## 2021-03-18 DIAGNOSIS — Z79.899 LONG TERM CURRENT USE OF AMIODARONE: Chronic | ICD-10-CM

## 2021-03-18 PROBLEM — Z91.89 AT RISK FOR AMIODARONE TOXICITY WITH LONG TERM USE: Status: ACTIVE | Noted: 2021-03-18

## 2021-03-18 PROBLEM — Z91.89 AT RISK FOR AMIODARONE TOXICITY WITH LONG TERM USE: Chronic | Status: ACTIVE | Noted: 2021-03-18

## 2021-03-18 LAB
T4 FREE SERPL-MCNC: 1.08 NG/DL (ref 0.93–1.7)
TSH SERPL DL<=0.005 MIU/L-ACNC: 2.68 UIU/ML (ref 0.27–4.2)

## 2021-03-18 PROCEDURE — 99214 OFFICE O/P EST MOD 30 MIN: CPT | Performed by: INTERNAL MEDICINE

## 2021-03-18 NOTE — PROGRESS NOTES
"Chief Complaint  Atrial Fibrillation    Subjective    History of Present Illness      I saw Meena Cesar today for continued cardiovascular care.  She is a very pleasant 65-year-old female who observes the CDC guidelines during the coronavirus pandemic.  Meena overall reports feeling well.  She remains free of chest pains pressure tightness.  She has her baseline dyspnea on exertion but this is not progressive.  She does not experience orthopnea, PND or lower extremity edema.  She denies syncope or any significant palpitations.  She does report occasional orthostatic dizziness which is limited.  She is on long-term amiodarone and Coumadin anticoagulation with a history of atrial flutter and fibrillation.  She tolerates her anticoagulation well free of any significant bruising or bleeding.  Last echocardiogram 2/20/2019 revealed preserved left ventricular function, mild left ventricular hypertrophy with mild mitral tricuspid insufficiency.  She remains obese with a BMI of 38.74.    Objective   Vital Signs:   /72   Pulse 85   Ht 167.6 cm (66\")   Wt 109 kg (240 lb)   BMI 38.74 kg/m²     Constitutional:       Appearance: Well-developed.   Eyes:      Conjunctiva/sclera: Conjunctivae normal.      Pupils: Pupils are equal, round, and reactive to light.   HENT:      Head: Normocephalic and atraumatic.   Neck:      Thyroid: No thyromegaly.   Pulmonary:      Effort: Pulmonary effort is normal. No respiratory distress.      Breath sounds: Normal breath sounds. No wheezing. No rales.   Cardiovascular:      Normal rate. Regular rhythm.      No gallop. No S3 and S4 gallop. Midsystolic click click.   Edema:     Peripheral edema absent.   Abdominal:      General: Bowel sounds are normal. There is no distension.      Palpations: Abdomen is soft. There is no abdominal mass.      Tenderness: There is no abdominal tenderness.   Musculoskeletal: Normal range of motion.      Cervical back: Neck supple. Skin:     General: " Skin is warm and dry.      Findings: No erythema.   Neurological:      Mental Status: Alert and oriented to person, place, and time.         Result Review :   The following data was reviewed by: Ajay Hathaway MD on 03/18/2021:  Common labs    Common Labsle 6/27/20 3/12/21   Total Protein 6.7    Albumin 3.5 (A)    Total Bilirubin 0.3    Alkaline Phosphatase 115    AST (SGOT) 16    ALT (SGPT) 14    WBC  5.89   Hemoglobin  13.2   Hematocrit  40.3   Platelets  223   (A) Abnormal value            Data reviewed: Cardiology studies Echocardiogram 2/21/2019          Assessment and Plan    1. Paroxysmal atrial fibrillation (CMS/HCC)  Stable  - TSH; Future  - T4, Free; Future  - Full Pulmonary Function Test With Bronchodilator; Future    2. Atrial flutter with controlled response (CMS/HCC)  Stable    3. Current use of long term anticoagulation  Well-tolerated    4. Long term current use of amiodarone  Monitor for toxicity  - TSH; Future  - T4, Free; Future  - Full Pulmonary Function Test With Bronchodilator; Future    5. Palpitations  Stable    6. Class 2 obesity due to excess calories without serious comorbidity with body mass index (BMI) of 37.0 to 37.9 in adult  Encourage weight reduction    Ms. Cesar is free of angina euvolemic on physical examination.  She continues to tolerate her medications well.  We will continue to monitor her long-term anticoagulation with Coumadin and amiodarone for toxicity.  I will plan to see her in follow-up in 6 months sooner if needed.    Follow Up   No follow-ups on file.  Patient was given instructions and counseling regarding her condition or for health maintenance advice. Please see specific information pulled into the AVS if appropriate.

## 2021-03-19 ENCOUNTER — TELEPHONE (OUTPATIENT)
Dept: FAMILY MEDICINE CLINIC | Facility: CLINIC | Age: 66
End: 2021-03-19

## 2021-03-19 ENCOUNTER — OFFICE VISIT (OUTPATIENT)
Dept: FAMILY MEDICINE CLINIC | Facility: CLINIC | Age: 66
End: 2021-03-19

## 2021-03-19 ENCOUNTER — TELEPHONE (OUTPATIENT)
Dept: CARDIOLOGY | Facility: CLINIC | Age: 66
End: 2021-03-19

## 2021-03-19 ENCOUNTER — BULK ORDERING (OUTPATIENT)
Dept: CASE MANAGEMENT | Facility: OTHER | Age: 66
End: 2021-03-19

## 2021-03-19 VITALS
TEMPERATURE: 97.1 F | HEIGHT: 66 IN | WEIGHT: 239 LBS | HEART RATE: 87 BPM | SYSTOLIC BLOOD PRESSURE: 110 MMHG | BODY MASS INDEX: 38.41 KG/M2 | DIASTOLIC BLOOD PRESSURE: 86 MMHG | OXYGEN SATURATION: 98 %

## 2021-03-19 DIAGNOSIS — I48.0 PAROXYSMAL ATRIAL FIBRILLATION (HCC): Primary | ICD-10-CM

## 2021-03-19 DIAGNOSIS — Z23 IMMUNIZATION DUE: ICD-10-CM

## 2021-03-19 DIAGNOSIS — I48.0 PAROXYSMAL ATRIAL FIBRILLATION (HCC): Primary | Chronic | ICD-10-CM

## 2021-03-19 DIAGNOSIS — Z76.89 ESTABLISHING CARE WITH NEW DOCTOR, ENCOUNTER FOR: ICD-10-CM

## 2021-03-19 PROCEDURE — 99203 OFFICE O/P NEW LOW 30 MIN: CPT | Performed by: NURSE PRACTITIONER

## 2021-03-19 NOTE — TELEPHONE ENCOUNTER
Pt # [phone]    daxa Cline did receive your message and called back at 1040am. Pt is not able to go to the [facility] office on Thursdays, which would have her coming to [facility] another day. She is going to call PCP office, and see if she can have them manage since we are only there on Thrs. I told her to call back if she needed us to do anything further.

## 2021-03-19 NOTE — TELEPHONE ENCOUNTER
I spoke with Mariann. PCP would prefer Ajay to manage. Per Mariann, their office will drawn INR as pt has transportation issues on Thursdays. We will dose in our coumadin clinic. INR order entered. Marlyn

## 2021-03-19 NOTE — TELEPHONE ENCOUNTER
Could you please call me to discuss this pt. I am getting mixed information. [phone].    Marlyn

## 2021-03-19 NOTE — TELEPHONE ENCOUNTER
LM on both numbers that Lab INR scheduled next Fri 3/26 at 9. I asked that she call me back if that will not work for her.     S/W Marlyn. She will put in a standing order and patient will call us to schedule per results

## 2021-03-19 NOTE — PROGRESS NOTES
"Chief Complaint  Atrial Fibrillation ([facility] care)    Subjective          Meena Cesar presents to [facility] PRIMARY CARE  Atrial Fibrillation  Presents for follow-up visit. Symptoms are negative for chest pain, dizziness, hypertension, hypotension, palpitations, shortness of breath and syncope. The symptoms have been stable. Past medical history includes atrial fibrillation. There are no medication compliance problems.     I have reviewed the patient's medical history in detail and updated the computerized patient record.    Current Outpatient Medications:   •  amiodarone (PACERONE) 200 MG tablet, TAKE 1 TABLET BY MOUTH EVERY DAY, Disp: 90 tablet, Rfl: 1  •  levETIRAcetam (KEPPRA) 500 MG tablet, Take 1,000 mg by mouth Every Evening., Disp: , Rfl: 3  •  omeprazole (priLOSEC) 40 MG capsule, Take 40 mg by mouth Daily., Disp: , Rfl:   •  phenytoin (DILANTIN) 100 MG ER capsule, Take 300 mg by mouth Daily., Disp: , Rfl:   •  phenytoin (DILANTIN) 30 MG ER capsule, Take 30 mg by mouth Every Night., Disp: , Rfl:   •  propranolol (INDERAL) 40 MG tablet, Take 20 mg by mouth 2 (Two) Times a Day., Disp: , Rfl:   •  SYMBICORT 160-4.5 MCG/ACT inhaler, Inhale 2 puffs As Needed., Disp: , Rfl:   •  warfarin (COUMADIN) 5 MG tablet, Take 5 mg by mouth Daily., Disp: , Rfl:      Objective   Vital Signs:   /86 (BP Location: Right arm, Patient Position: Sitting, Cuff Size: Adult)   Pulse 87   Temp 97.1 °F (36.2 °C) (Infrared)   Ht 167.6 cm (66\")   Wt 108 kg (239 lb)   SpO2 98%   BMI 38.58 kg/m²     Physical Exam  Vitals reviewed.   Constitutional:       Appearance: Normal appearance.   HENT:      Right Ear: Tympanic membrane normal.      Left Ear: Tympanic membrane normal.   Neck:      Vascular: No carotid bruit.   Cardiovascular:      Rate and Rhythm: Normal rate and regular rhythm.      Pulses: Normal pulses.      Heart sounds: Normal heart sounds.   Pulmonary:      Effort: Pulmonary effort is " normal.      Breath sounds: Normal breath sounds.   Musculoskeletal:      Cervical back: Normal range of motion and neck supple. No tenderness.      Right lower leg: No edema.      Left lower leg: No edema.   Skin:     General: Skin is warm and dry.   Neurological:      Mental Status: She is alert and oriented to person, place, and time.   Psychiatric:      Comments: No acute distress        Result Review :                 Assessment and Plan    Diagnoses and all orders for this visit:    1. Paroxysmal atrial fibrillation (CMS/HCC) (Primary)    2. Establishing care with new doctor, encounter for    Ms. Cesar presents today to establish care with a new PCP.   I have reviewed her medical records that are available in the epic EMR system.  She is to continue all medications as prescribed.   She is to follow as needed and in six motnhs for an annual physical exam with fasting labs the week before.     Follow Up   Return for september annual physical , Fasting labs 1 week prior to next f/u.  Patient was given instructions and counseling regarding her condition or for health maintenance advice. Please see specific information pulled into the AVS if appropriate.

## 2021-03-19 NOTE — TELEPHONE ENCOUNTER
Renetta Mac would really like Dr Hathaway to monitor her INR. I know that she is unavailable on Thursdays. Renetta is asking if possible can you just enter PT/INR lab orders for [facility] and we can bring patient in for lab draws on Fridays, with Dr Hathaway to monitor levels.     Would you mind to check with him and let me know if this would work?     She would like to come next Friday for a test. Let me know and I can call her for scheduling.

## 2021-03-19 NOTE — TELEPHONE ENCOUNTER
CNA PT    Good morning! I wanted to let you know that this patient now has Renetta RAMÍREZ as her PCP. She used to get her INR & warfarin prescription from Dr. Martinez. Renetta RAMÍREZ has asked if Dr Hewitt will take over the INR & warfarin prescription. Patient will need to be put on the INR rotation schedule.

## 2021-03-19 NOTE — TELEPHONE ENCOUNTER
LMOM for pt to return my call to make arrangements for next INR in our office. I'm assuming this will be done in [place] but need to verify preferred location as well. Marlyn

## 2021-03-25 ENCOUNTER — TELEPHONE (OUTPATIENT)
Dept: CARDIOLOGY | Facility: CLINIC | Age: 66
End: 2021-03-25

## 2021-04-01 ENCOUNTER — CLINICAL SUPPORT (OUTPATIENT)
Dept: CARDIOLOGY | Facility: CLINIC | Age: 66
End: 2021-04-01

## 2021-04-01 DIAGNOSIS — I48.0 PAROXYSMAL ATRIAL FIBRILLATION (HCC): Primary | Chronic | ICD-10-CM

## 2021-04-01 LAB — INR PPP: 3.3 (ref 2–3)

## 2021-04-01 PROCEDURE — 85610 PROTHROMBIN TIME: CPT | Performed by: INTERNAL MEDICINE

## 2021-04-01 PROCEDURE — 36416 COLLJ CAPILLARY BLOOD SPEC: CPT | Performed by: INTERNAL MEDICINE

## 2021-04-05 ENCOUNTER — TELEPHONE (OUTPATIENT)
Dept: CARDIOLOGY | Facility: CLINIC | Age: 66
End: 2021-04-05

## 2021-04-08 ENCOUNTER — CLINICAL SUPPORT (OUTPATIENT)
Dept: CARDIOLOGY | Facility: CLINIC | Age: 66
End: 2021-04-08

## 2021-04-08 DIAGNOSIS — I48.0 PAROXYSMAL ATRIAL FIBRILLATION (HCC): Primary | Chronic | ICD-10-CM

## 2021-04-08 LAB — INR PPP: 3.9 (ref 2–3)

## 2021-04-08 PROCEDURE — 36416 COLLJ CAPILLARY BLOOD SPEC: CPT | Performed by: INTERNAL MEDICINE

## 2021-04-08 PROCEDURE — 85610 PROTHROMBIN TIME: CPT | Performed by: INTERNAL MEDICINE

## 2021-04-14 ENCOUNTER — ANTICOAGULATION VISIT (OUTPATIENT)
Dept: CARDIOLOGY | Facility: CLINIC | Age: 66
End: 2021-04-14

## 2021-04-14 ENCOUNTER — TELEPHONE (OUTPATIENT)
Dept: CARDIOLOGY | Facility: CLINIC | Age: 66
End: 2021-04-14

## 2021-04-14 LAB — INR PPP: 2.7

## 2021-04-14 NOTE — TELEPHONE ENCOUNTER
CNA PT    This patient called the PCP office letting them know that she is now doing her own INR's at home. She is wanting to know what she needs to do to get the results sent to Dr Hathaway. Can you help her get set up with the home INR program.

## 2021-04-21 ENCOUNTER — ANTICOAGULATION VISIT (OUTPATIENT)
Dept: CARDIOLOGY | Facility: CLINIC | Age: 66
End: 2021-04-21

## 2021-04-21 LAB — INR PPP: 2.6

## 2021-04-28 ENCOUNTER — ANTICOAGULATION VISIT (OUTPATIENT)
Dept: CARDIOLOGY | Facility: CLINIC | Age: 66
End: 2021-04-28

## 2021-04-28 DIAGNOSIS — I48.0 PAROXYSMAL ATRIAL FIBRILLATION (HCC): Primary | ICD-10-CM

## 2021-04-28 LAB — INR PPP: 3 (ref 2–3)

## 2021-05-05 ENCOUNTER — ANTICOAGULATION VISIT (OUTPATIENT)
Dept: CARDIOLOGY | Facility: CLINIC | Age: 66
End: 2021-05-05

## 2021-05-05 DIAGNOSIS — I48.0 PAROXYSMAL ATRIAL FIBRILLATION (HCC): Primary | ICD-10-CM

## 2021-05-05 LAB — INR PPP: 2.3 (ref 2–3)

## 2021-05-19 ENCOUNTER — ANTICOAGULATION VISIT (OUTPATIENT)
Dept: CARDIOLOGY | Facility: CLINIC | Age: 66
End: 2021-05-19

## 2021-05-19 DIAGNOSIS — I48.0 PAROXYSMAL ATRIAL FIBRILLATION (HCC): Primary | ICD-10-CM

## 2021-05-19 LAB — INR PPP: 2.4 (ref 2–3)

## 2021-06-02 ENCOUNTER — ANTICOAGULATION VISIT (OUTPATIENT)
Dept: CARDIOLOGY | Facility: CLINIC | Age: 66
End: 2021-06-02

## 2021-06-02 DIAGNOSIS — I48.0 PAROXYSMAL ATRIAL FIBRILLATION (HCC): Primary | ICD-10-CM

## 2021-06-02 LAB — INR PPP: 2.7 (ref 2–3)

## 2021-06-23 ENCOUNTER — ANTICOAGULATION VISIT (OUTPATIENT)
Dept: CARDIOLOGY | Facility: CLINIC | Age: 66
End: 2021-06-23

## 2021-06-23 DIAGNOSIS — I48.0 PAROXYSMAL ATRIAL FIBRILLATION (HCC): Primary | ICD-10-CM

## 2021-06-23 LAB — INR PPP: 2 (ref 2–3)

## 2021-07-03 DIAGNOSIS — I48.0 PAROXYSMAL ATRIAL FIBRILLATION (HCC): ICD-10-CM

## 2021-07-06 RX ORDER — AMIODARONE HYDROCHLORIDE 200 MG/1
TABLET ORAL
Qty: 90 TABLET | Refills: 1 | Status: SHIPPED | OUTPATIENT
Start: 2021-07-06 | End: 2021-12-21

## 2021-07-12 RX ORDER — WARFARIN SODIUM 5 MG/1
5 TABLET ORAL DAILY
Qty: 90 TABLET | Refills: 1 | Status: CANCELLED | OUTPATIENT
Start: 2021-07-12

## 2021-07-12 NOTE — TELEPHONE ENCOUNTER
Patient is in need of a script of Warfarin 5 MG to be sent to the [facility] pharmacy on file.     ThanksDeon

## 2021-07-13 RX ORDER — WARFARIN SODIUM 5 MG/1
5 TABLET ORAL DAILY
Qty: 90 TABLET | Refills: 0 | Status: SHIPPED | OUTPATIENT
Start: 2021-07-13 | End: 2022-06-13

## 2021-07-14 ENCOUNTER — ANTICOAGULATION VISIT (OUTPATIENT)
Dept: CARDIOLOGY | Facility: CLINIC | Age: 66
End: 2021-07-14

## 2021-07-14 LAB — INR PPP: 2.3

## 2021-08-04 ENCOUNTER — ANTICOAGULATION VISIT (OUTPATIENT)
Dept: CARDIOLOGY | Facility: CLINIC | Age: 66
End: 2021-08-04

## 2021-08-04 DIAGNOSIS — I48.0 PAROXYSMAL ATRIAL FIBRILLATION (HCC): Primary | ICD-10-CM

## 2021-08-04 LAB — INR PPP: 3.4 (ref 2–3)

## 2021-08-11 ENCOUNTER — ANTICOAGULATION VISIT (OUTPATIENT)
Dept: CARDIOLOGY | Facility: CLINIC | Age: 66
End: 2021-08-11

## 2021-08-11 DIAGNOSIS — I48.0 PAROXYSMAL ATRIAL FIBRILLATION (HCC): Primary | ICD-10-CM

## 2021-08-11 LAB — INR PPP: 2.4 (ref 2–3)

## 2021-08-18 ENCOUNTER — ANTICOAGULATION VISIT (OUTPATIENT)
Dept: CARDIOLOGY | Facility: CLINIC | Age: 66
End: 2021-08-18

## 2021-08-18 DIAGNOSIS — I48.0 PAROXYSMAL ATRIAL FIBRILLATION (HCC): Primary | ICD-10-CM

## 2021-08-18 LAB — INR PPP: 2.2 (ref 2–3)

## 2021-09-01 ENCOUNTER — ANTICOAGULATION VISIT (OUTPATIENT)
Dept: CARDIOLOGY | Facility: CLINIC | Age: 66
End: 2021-09-01

## 2021-09-01 LAB — INR PPP: 3.6

## 2021-09-08 ENCOUNTER — ANTICOAGULATION VISIT (OUTPATIENT)
Dept: CARDIOLOGY | Facility: CLINIC | Age: 66
End: 2021-09-08

## 2021-09-08 DIAGNOSIS — I48.0 PAROXYSMAL ATRIAL FIBRILLATION (HCC): Primary | ICD-10-CM

## 2021-09-08 LAB — INR PPP: 2.6 (ref 2–3)

## 2021-09-09 DIAGNOSIS — Z91.89 AT RISK FOR AMIODARONE TOXICITY WITH LONG TERM USE: ICD-10-CM

## 2021-09-09 DIAGNOSIS — Z11.59 ENCOUNTER FOR HEPATITIS C SCREENING TEST FOR LOW RISK PATIENT: ICD-10-CM

## 2021-09-09 DIAGNOSIS — Z00.00 ROUTINE GENERAL MEDICAL EXAMINATION AT A HEALTH CARE FACILITY: Primary | ICD-10-CM

## 2021-09-09 DIAGNOSIS — Z79.899 AT RISK FOR AMIODARONE TOXICITY WITH LONG TERM USE: ICD-10-CM

## 2021-09-15 ENCOUNTER — ANTICOAGULATION VISIT (OUTPATIENT)
Dept: CARDIOLOGY | Facility: CLINIC | Age: 66
End: 2021-09-15

## 2021-09-15 DIAGNOSIS — I48.0 PAROXYSMAL ATRIAL FIBRILLATION (HCC): Primary | ICD-10-CM

## 2021-09-15 LAB — INR PPP: 3.8 (ref 2–3)

## 2021-09-22 ENCOUNTER — ANTICOAGULATION VISIT (OUTPATIENT)
Dept: CARDIOLOGY | Facility: CLINIC | Age: 66
End: 2021-09-22

## 2021-09-22 DIAGNOSIS — I48.0 PAROXYSMAL ATRIAL FIBRILLATION (HCC): Primary | ICD-10-CM

## 2021-09-22 LAB — INR PPP: 2.3 (ref 2–3)

## 2021-09-23 ENCOUNTER — OFFICE VISIT (OUTPATIENT)
Dept: CARDIOLOGY | Facility: CLINIC | Age: 66
End: 2021-09-23

## 2021-09-23 ENCOUNTER — OFFICE VISIT (OUTPATIENT)
Dept: FAMILY MEDICINE CLINIC | Facility: CLINIC | Age: 66
End: 2021-09-23

## 2021-09-23 VITALS
DIASTOLIC BLOOD PRESSURE: 62 MMHG | HEIGHT: 66 IN | OXYGEN SATURATION: 97 % | SYSTOLIC BLOOD PRESSURE: 110 MMHG | WEIGHT: 235 LBS | HEART RATE: 95 BPM | BODY MASS INDEX: 37.77 KG/M2

## 2021-09-23 VITALS
DIASTOLIC BLOOD PRESSURE: 62 MMHG | SYSTOLIC BLOOD PRESSURE: 110 MMHG | BODY MASS INDEX: 37.77 KG/M2 | HEART RATE: 95 BPM | HEIGHT: 66 IN | WEIGHT: 235 LBS

## 2021-09-23 DIAGNOSIS — I34.0 MITRAL VALVE INSUFFICIENCY, UNSPECIFIED ETIOLOGY: ICD-10-CM

## 2021-09-23 DIAGNOSIS — I48.0 PAROXYSMAL ATRIAL FIBRILLATION (HCC): ICD-10-CM

## 2021-09-23 DIAGNOSIS — N28.9 RENAL INSUFFICIENCY: ICD-10-CM

## 2021-09-23 DIAGNOSIS — Z79.01 CURRENT USE OF LONG TERM ANTICOAGULATION: ICD-10-CM

## 2021-09-23 DIAGNOSIS — E66.09 CLASS 2 OBESITY DUE TO EXCESS CALORIES WITHOUT SERIOUS COMORBIDITY WITH BODY MASS INDEX (BMI) OF 37.0 TO 37.9 IN ADULT: ICD-10-CM

## 2021-09-23 DIAGNOSIS — Z00.00 ENCOUNTER FOR ANNUAL PHYSICAL EXAM: Primary | ICD-10-CM

## 2021-09-23 DIAGNOSIS — Z79.899 LONG TERM CURRENT USE OF AMIODARONE: ICD-10-CM

## 2021-09-23 DIAGNOSIS — R73.01 IFG (IMPAIRED FASTING GLUCOSE): ICD-10-CM

## 2021-09-23 DIAGNOSIS — I48.92 ATRIAL FLUTTER WITH CONTROLLED RESPONSE (HCC): Primary | ICD-10-CM

## 2021-09-23 PROCEDURE — 99214 OFFICE O/P EST MOD 30 MIN: CPT | Performed by: INTERNAL MEDICINE

## 2021-09-23 PROCEDURE — 99397 PER PM REEVAL EST PAT 65+ YR: CPT | Performed by: NURSE PRACTITIONER

## 2021-09-23 RX ORDER — ALBUTEROL SULFATE 90 UG/1
AEROSOL, METERED RESPIRATORY (INHALATION)
COMMUNITY
Start: 2021-08-26 | End: 2022-09-19

## 2021-09-23 RX ORDER — OMEPRAZOLE 40 MG/1
40 CAPSULE, DELAYED RELEASE ORAL DAILY
Qty: 90 CAPSULE | Refills: 3 | Status: SHIPPED | OUTPATIENT
Start: 2021-09-23 | End: 2022-09-21

## 2021-09-23 NOTE — PROGRESS NOTES
Chief Complaint  No chief complaint on file.    Subjective    History of Present Illness      Pleasant 66-year-old female with a history of paroxysmal atrial fibrillation and prior atrial flutter.  She remains on long-term anticoagulation which she tolerates well.  She is on amiodarone for rhythm control.  She denies chest pain pressure tightness.  She is free of any significant or progressive dyspnea on exertion.  She denies orthopnea or PND and no lower extremity edema.  She is free of syncope or near syncope.  She reports rare palpitations.  She is obese with a BMI of 37.93.    Objective   Vital Signs:   There were no vitals taken for this visit.    Constitutional:       Appearance: Well-developed. Obese.   Eyes:      Conjunctiva/sclera: Conjunctivae normal.      Pupils: Pupils are equal, round, and reactive to light.   HENT:      Head: Normocephalic and atraumatic.   Neck:      Thyroid: No thyromegaly.   Pulmonary:      Effort: Pulmonary effort is normal. No respiratory distress.      Breath sounds: Normal breath sounds. No wheezing. No rales.   Cardiovascular:      Normal rate. Regular rhythm.      No gallop. No S3 and S4 gallop. No rub.   Edema:     Peripheral edema absent.   Abdominal:      General: Bowel sounds are normal. There is no distension.      Palpations: Abdomen is soft. There is no abdominal mass.      Tenderness: There is no abdominal tenderness.   Musculoskeletal: Normal range of motion.      Cervical back: Neck supple. Skin:     General: Skin is warm and dry.      Findings: No erythema.   Neurological:      Mental Status: Alert and oriented to person, place, and time.         Result Review :     Common labs    Common Labsle 3/12/21 9/16/21 9/16/21 9/16/21     0922 0922 0922   Glucose   124 (A)    BUN   15    Creatinine   1.07 (A)    eGFR Non  Am   51 (A)    eGFR African Am   62    Sodium   138    Potassium   4.7    Chloride   107    Calcium   9.4    Total Protein   6.6    Albumin   3.80     Total Bilirubin   0.3    Alkaline Phosphatase   129 (A)    AST (SGOT)   16    ALT (SGPT)   13    WBC 5.89 5.73     Hemoglobin 13.2 13.4     Hematocrit 40.3 40.5     Platelets 223 223     Total Cholesterol    139   Triglycerides    108   HDL Cholesterol    56   LDL Cholesterol     63   (A) Abnormal value       Comments are available for some flowsheets but are not being displayed.                     Assessment and Plan    1. Atrial flutter with controlled response (CMS/HCC)  Stable    2. Paroxysmal atrial fibrillation (HCC)  Stable    3. Current use of long term anticoagulation  Well-tolerated    4. Long term current use of amiodarone  Continue to monitor for toxic side effects    5. Mitral valve insufficiency, unspecified etiology  Compensated    6. Class 2 obesity due to excess calories without serious comorbidity with body mass index (BMI) of 37.0 to 37.9 in adult  Encourage weight reduction    Meena remains free of angina and is euvolemic on physical examination.  I have encouraged her to remain active and observe a low-cholesterol low saturated fat weight reduction diet.  We will obtain pulmonary function tests as she did not complete this following her last visit.  I will see her in follow-up in 6 months sooner if needed.  She will continue long-term anticoagulation for stroke risk reduction.        Follow Up   No follow-ups on file.  Patient was given instructions and counseling regarding her condition or for health maintenance advice. Please see specific information pulled into the AVS if appropriate.

## 2021-09-23 NOTE — PROGRESS NOTES
"Chief Complaint  Annual Exam (& review labs)    Subjective          Meena Cesar presents to [facility]  Ms. Cesar presents today for an annual physical exam with lab review.     I have reviewed the patient's medical history in detail and updated the computerized patient record.    Current Outpatient Medications:   •  albuterol sulfate  (90 Base) MCG/ACT inhaler, 1 (ONE) AEROSOL SOLN TWO PUFFS EVERY 4 HOURS AS NEEDED, Disp: , Rfl:   •  amiodarone (PACERONE) 200 MG tablet, TAKE 1 TABLET BY MOUTH EVERY DAY, Disp: 90 tablet, Rfl: 1  •  levETIRAcetam (KEPPRA) 500 MG tablet, Take 1,000 mg by mouth Every Evening., Disp: , Rfl: 3  •  omeprazole (priLOSEC) 40 MG capsule, Take 40 mg by mouth Daily., Disp: , Rfl:   •  phenytoin (DILANTIN) 100 MG ER capsule, Take 300 mg by mouth Every Evening., Disp: , Rfl:   •  phenytoin (DILANTIN) 30 MG ER capsule, Take 30 mg by mouth Every Evening., Disp: , Rfl:   •  propranolol (INDERAL) 40 MG tablet, Take 20 mg by mouth 2 (Two) Times a Day., Disp: , Rfl:   •  SYMBICORT 160-4.5 MCG/ACT inhaler, Inhale 2 puffs As Needed., Disp: , Rfl:   •  warfarin (COUMADIN) 5 MG tablet, Take 1 tablet by mouth Daily., Disp: 90 tablet, Rfl: 0     Review of Systems   Constitutional: Negative.    HENT: Negative.    Eyes: Negative.    Respiratory: Negative.    Cardiovascular: Negative.    Gastrointestinal: Negative.    Genitourinary: Negative.    Musculoskeletal: Negative.    Skin: Negative.    Neurological: Negative.    Psychiatric/Behavioral: Negative.        Objective   Vital Signs:   /62 (BP Location: Right arm, Patient Position: Sitting, Cuff Size: Adult)   Pulse 95   Ht 167.6 cm (66\")   Wt 107 kg (235 lb)   SpO2 97%   BMI 37.93 kg/m²       Physical Exam  Vitals reviewed.   Constitutional:       Appearance: Normal appearance. She is obese.   HENT:      Right Ear: Tympanic membrane normal.      Left Ear: Tympanic membrane normal.   Neck:      " Vascular: No carotid bruit.   Cardiovascular:      Rate and Rhythm: Normal rate. Rhythm irregular.      Pulses: Normal pulses.      Heart sounds: Normal heart sounds.   Pulmonary:      Effort: Pulmonary effort is normal.      Breath sounds: Normal breath sounds.   Abdominal:      General: Bowel sounds are normal.      Palpations: Abdomen is soft.   Musculoskeletal:         General: Normal range of motion.      Cervical back: Normal range of motion and neck supple.      Right lower leg: No edema.      Left lower leg: No edema.   Lymphadenopathy:      Cervical: No cervical adenopathy.   Skin:     General: Skin is warm and dry.   Neurological:      Mental Status: She is alert and oriented to person, place, and time.   Psychiatric:         Mood and Affect: Mood normal.         Behavior: Behavior normal.         Thought Content: Thought content normal.         Judgment: Judgment normal.        Result Review :     CMP    CMP 9/16/21   Glucose 124 (A)   BUN 15   Creatinine 1.07 (A)   eGFR Non  Am 51 (A)   eGFR African Am 62   Sodium 138   Potassium 4.7   Chloride 107   Calcium 9.4   Total Protein 6.6   Albumin 3.80   Globulin 2.8   Total Bilirubin 0.3   Alkaline Phosphatase 129 (A)   AST (SGOT) 16   ALT (SGPT) 13   (A) Abnormal value       Comments are available for some flowsheets but are not being displayed.           CBC    CBC 3/12/21 9/16/21   WBC 5.89 5.73   RBC 4.15 4.23   Hemoglobin 13.2 13.4   Hematocrit 40.3 40.5   MCV 97.1 95.7   MCH 31.8 31.7   MCHC 32.8 33.1   RDW 13.5 13.0   Platelets 223 223           Lipid Panel    Lipid Panel 9/16/21   Total Cholesterol 139   Triglycerides 108   HDL Cholesterol 56   VLDL Cholesterol 20   LDL Cholesterol  63      Comments are available for some flowsheets but are not being displayed.           TSH    TSH 3/18/21 9/16/21   TSH 2.680 2.310                         Assessment and Plan    Diagnoses and all orders for this visit:    1. Encounter for annual physical exam  (Primary)    2. IFG (impaired fasting glucose)  -     Basic Metabolic Panel; Future  -     Hemoglobin A1c; Future    3. Renal insufficiency  -     Basic Metabolic Panel; Future    Ms. Cesar appears to be doing well today.  Her physical exam is unremarkable for her today.   I have reviewed her labs with her.   Her lipid levels are within normal parameters. Her fasting glucose is elevated at 124. Her creatinine is slightly up at 1.07 and her eGFR has decreased to 51. I will continue to monitor these levels. I will have her return in 6 months to evaluate her renal and glucose levels. (BMP and A1C).     Follow Up   Return in about 1 year (around 9/23/2022) for Annual physical, Fasting labs 1 week prior to next f/u.  Patient was given instructions and counseling regarding her condition or for health maintenance advice. Please see specific information pulled into the AVS if appropriate.

## 2021-09-29 ENCOUNTER — ANTICOAGULATION VISIT (OUTPATIENT)
Dept: CARDIOLOGY | Facility: CLINIC | Age: 66
End: 2021-09-29

## 2021-09-29 DIAGNOSIS — I48.0 PAROXYSMAL ATRIAL FIBRILLATION (HCC): Primary | ICD-10-CM

## 2021-09-29 LAB — INR PPP: 3.3 (ref 2–3)

## 2021-10-02 ENCOUNTER — LAB (OUTPATIENT)
Dept: LAB | Facility: HOSPITAL | Age: 66
End: 2021-10-02

## 2021-10-02 DIAGNOSIS — Z01.818 OTHER SPECIFIED PRE-OPERATIVE EXAMINATION: ICD-10-CM

## 2021-10-02 LAB — SARS-COV-2 ORF1AB RESP QL NAA+PROBE: NOT DETECTED

## 2021-10-02 PROCEDURE — U0004 COV-19 TEST NON-CDC HGH THRU: HCPCS

## 2021-10-02 PROCEDURE — C9803 HOPD COVID-19 SPEC COLLECT: HCPCS

## 2021-10-04 ENCOUNTER — HOSPITAL ENCOUNTER (OUTPATIENT)
Dept: RESPIRATORY THERAPY | Facility: HOSPITAL | Age: 66
Discharge: HOME | End: 2021-10-04
Admitting: INTERNAL MEDICINE

## 2021-10-04 DIAGNOSIS — Z79.899 LONG TERM CURRENT USE OF AMIODARONE: ICD-10-CM

## 2021-10-04 PROCEDURE — 94726 PLETHYSMOGRAPHY LUNG VOLUMES: CPT

## 2021-10-04 PROCEDURE — 94640 AIRWAY INHALATION TREATMENT: CPT

## 2021-10-04 PROCEDURE — 94729 DIFFUSING CAPACITY: CPT

## 2021-10-04 PROCEDURE — 94060 EVALUATION OF WHEEZING: CPT

## 2021-10-04 RX ORDER — ALBUTEROL SULFATE 2.5 MG/3ML
2.5 SOLUTION RESPIRATORY (INHALATION) ONCE
Status: COMPLETED | OUTPATIENT
Start: 2021-10-04 | End: 2021-10-04

## 2021-10-04 RX ADMIN — ALBUTEROL SULFATE 2.5 MG: 2.5 SOLUTION RESPIRATORY (INHALATION) at 10:04

## 2021-10-05 DIAGNOSIS — R94.2 ABNORMAL PFT: Primary | ICD-10-CM

## 2021-10-06 ENCOUNTER — ANTICOAGULATION VISIT (OUTPATIENT)
Dept: CARDIOLOGY | Facility: CLINIC | Age: 66
End: 2021-10-06

## 2021-10-06 DIAGNOSIS — I48.0 PAROXYSMAL ATRIAL FIBRILLATION (HCC): Primary | ICD-10-CM

## 2021-10-06 LAB — INR PPP: 2.5 (ref 2–3)

## 2021-10-13 ENCOUNTER — ANTICOAGULATION VISIT (OUTPATIENT)
Dept: CARDIOLOGY | Facility: CLINIC | Age: 66
End: 2021-10-13

## 2021-10-13 DIAGNOSIS — I48.0 PAROXYSMAL ATRIAL FIBRILLATION (HCC): Primary | ICD-10-CM

## 2021-10-13 LAB — INR PPP: 2.1 (ref 2–3)

## 2021-10-16 ENCOUNTER — IMMUNIZATION (OUTPATIENT)
Dept: VACCINE CLINIC | Facility: HOSPITAL | Age: 66
End: 2021-10-16

## 2021-10-16 PROCEDURE — 91300 HC SARSCOV02 VAC 30MCG/0.3ML IM: CPT | Performed by: INTERNAL MEDICINE

## 2021-10-16 PROCEDURE — 0003A: CPT | Performed by: INTERNAL MEDICINE

## 2021-10-16 PROCEDURE — 0004A HC ADM SARSCOV2 30MCG/0.3ML BOOSTER: CPT | Performed by: INTERNAL MEDICINE

## 2021-10-27 ENCOUNTER — ANTICOAGULATION VISIT (OUTPATIENT)
Dept: CARDIOLOGY | Facility: CLINIC | Age: 66
End: 2021-10-27

## 2021-10-27 DIAGNOSIS — I48.0 PAROXYSMAL ATRIAL FIBRILLATION (HCC): Primary | ICD-10-CM

## 2021-10-27 LAB — INR PPP: 1.9 (ref 2–3)

## 2021-11-03 ENCOUNTER — ANTICOAGULATION VISIT (OUTPATIENT)
Dept: CARDIOLOGY | Facility: CLINIC | Age: 66
End: 2021-11-03

## 2021-11-03 DIAGNOSIS — I48.0 PAROXYSMAL ATRIAL FIBRILLATION (HCC): Primary | ICD-10-CM

## 2021-11-03 LAB — INR PPP: 2.7 (ref 2–3)

## 2021-11-17 ENCOUNTER — ANTICOAGULATION VISIT (OUTPATIENT)
Dept: CARDIOLOGY | Facility: CLINIC | Age: 66
End: 2021-11-17

## 2021-11-17 DIAGNOSIS — I48.0 PAROXYSMAL ATRIAL FIBRILLATION (HCC): Primary | ICD-10-CM

## 2021-11-17 LAB — INR PPP: 2.4 (ref 2–3)

## 2021-12-08 ENCOUNTER — ANTICOAGULATION VISIT (OUTPATIENT)
Dept: CARDIOLOGY | Facility: CLINIC | Age: 66
End: 2021-12-08

## 2021-12-08 DIAGNOSIS — I48.0 PAROXYSMAL ATRIAL FIBRILLATION (HCC): Primary | ICD-10-CM

## 2021-12-08 LAB — INR PPP: 2.7 (ref 2–3)

## 2021-12-17 ENCOUNTER — TELEPHONE (OUTPATIENT)
Dept: CARDIOLOGY | Facility: CLINIC | Age: 66
End: 2021-12-17

## 2021-12-17 RX ORDER — PROPRANOLOL HYDROCHLORIDE 40 MG/1
20 TABLET ORAL 2 TIMES DAILY
Qty: 90 TABLET | Refills: 3 | Status: SHIPPED | OUTPATIENT
Start: 2021-12-17 | End: 2022-10-19 | Stop reason: SDUPTHER

## 2021-12-17 NOTE — TELEPHONE ENCOUNTER
21-Dr Ajay Hathaway  Pt: Sarah Cesar  : 1955  [place]: [phone]  Reason for Call  Pt. Needs refill on her Propranolol, #90 40mg tabs. Pt. Takes 1/2 tab in am and 1/2 tab in pm. She uses [facility], P: [phone], F: [phone]

## 2021-12-21 DIAGNOSIS — I48.0 PAROXYSMAL ATRIAL FIBRILLATION (HCC): ICD-10-CM

## 2021-12-21 RX ORDER — AMIODARONE HYDROCHLORIDE 200 MG/1
TABLET ORAL
Qty: 90 TABLET | Refills: 1 | Status: SHIPPED | OUTPATIENT
Start: 2021-12-21 | End: 2022-03-18

## 2021-12-29 ENCOUNTER — ANTICOAGULATION VISIT (OUTPATIENT)
Dept: CARDIOLOGY | Facility: CLINIC | Age: 66
End: 2021-12-29

## 2021-12-29 DIAGNOSIS — I48.0 PAROXYSMAL ATRIAL FIBRILLATION (HCC): Primary | ICD-10-CM

## 2021-12-29 LAB — INR PPP: 2.7 (ref 2–3)

## 2022-01-26 ENCOUNTER — ANTICOAGULATION VISIT (OUTPATIENT)
Dept: CARDIOLOGY | Facility: CLINIC | Age: 67
End: 2022-01-26

## 2022-01-26 DIAGNOSIS — I48.0 PAROXYSMAL ATRIAL FIBRILLATION: Primary | ICD-10-CM

## 2022-01-26 LAB — INR PPP: 3.2 (ref 2–3)

## 2022-02-23 ENCOUNTER — ANTICOAGULATION VISIT (OUTPATIENT)
Dept: CARDIOLOGY | Facility: CLINIC | Age: 67
End: 2022-02-23

## 2022-02-23 DIAGNOSIS — I48.0 PAROXYSMAL ATRIAL FIBRILLATION: Primary | ICD-10-CM

## 2022-02-23 LAB — INR PPP: 4 (ref 2–3)

## 2022-03-02 ENCOUNTER — ANTICOAGULATION VISIT (OUTPATIENT)
Dept: CARDIOLOGY | Facility: CLINIC | Age: 67
End: 2022-03-02

## 2022-03-02 DIAGNOSIS — I48.0 PAF (PAROXYSMAL ATRIAL FIBRILLATION): Primary | ICD-10-CM

## 2022-03-02 LAB — INR PPP: 2.1 (ref 2–3)

## 2022-03-09 ENCOUNTER — ANTICOAGULATION VISIT (OUTPATIENT)
Dept: CARDIOLOGY | Facility: CLINIC | Age: 67
End: 2022-03-09

## 2022-03-09 DIAGNOSIS — I48.0 PAF (PAROXYSMAL ATRIAL FIBRILLATION): Primary | ICD-10-CM

## 2022-03-09 LAB — INR PPP: 3.1 (ref 2–3)

## 2022-03-18 DIAGNOSIS — I48.0 PAROXYSMAL ATRIAL FIBRILLATION: ICD-10-CM

## 2022-03-18 RX ORDER — AMIODARONE HYDROCHLORIDE 200 MG/1
TABLET ORAL
Qty: 90 TABLET | Refills: 1 | Status: SHIPPED | OUTPATIENT
Start: 2022-03-18 | End: 2022-03-24

## 2022-03-23 ENCOUNTER — ANTICOAGULATION VISIT (OUTPATIENT)
Dept: CARDIOLOGY | Facility: CLINIC | Age: 67
End: 2022-03-23

## 2022-03-23 DIAGNOSIS — I48.0 PAF (PAROXYSMAL ATRIAL FIBRILLATION): Primary | ICD-10-CM

## 2022-03-23 LAB — INR PPP: 3.1 (ref 2–3)

## 2022-03-23 NOTE — PROGRESS NOTES
"Chief Complaint  Atrial Fibrillation    Subjective    History of Present Illness      I saw Meena Cesar today for cardiovascular care.  She is a very pleasant 66-year-old female who is in good spirits and overall reports feeling well.  She remains free of chest pain pressure tightness.  She has her baseline dyspnea on exertion but this is not progressive.  She uses a cane for ambulation.  She denies orthopnea or PND no lower extremity edema.  She is free of syncope near syncope or any significant palpitations.  She has a history of paroxysmal atrial fibrillation and atrial flutter.  She has remained on amiodarone at 200 mg daily as well as long-term anticoagulation with warfarin.  She tolerates her anticoagulation well no significant bruising or bleeding.  Her last pulmonary function tests obtained to assess for amiodarone toxicity on 10/4/2021 revealed normal pre and postbronchodilator spirometry and lung volume.  There was moderate diffusion deficit noted.  Liver function tests have remained within normal limits with the exception of alkaline phosphatase mildly elevated at 129. Thyroid function tests are within normal limits.  Lipids admit remain well controlled.  She has had a 15 pound weight reduction but remains mildly obese with a BMI of 35.51.  Objective   Vital Signs:   /72   Pulse 73   Ht 167.6 cm (66\")   Wt 99.8 kg (220 lb)   BMI 35.51 kg/m²     Constitutional:       Appearance: Well-developed.   Eyes:      Conjunctiva/sclera: Conjunctivae normal.      Pupils: Pupils are equal, round, and reactive to light.   HENT:      Head: Normocephalic and atraumatic.   Neck:      Thyroid: No thyromegaly.   Pulmonary:      Effort: Pulmonary effort is normal. No respiratory distress.      Breath sounds: Normal breath sounds. No wheezing. No rales.   Cardiovascular:      Normal rate. Regular rhythm.      No gallop. No S3 and S4 gallop. No rub.   Edema:     Peripheral edema absent.   Abdominal:      General: " Bowel sounds are normal. There is no distension.      Palpations: Abdomen is soft. There is no abdominal mass.      Tenderness: There is no abdominal tenderness.   Musculoskeletal: Normal range of motion.      Cervical back: Neck supple. Skin:     General: Skin is warm and dry.      Findings: No erythema.   Neurological:      Mental Status: Alert and oriented to person, place, and time.         Result Review :     Common labs    Common Labsle 9/16/21 9/16/21 9/16/21 3/17/22 3/17/22    0922 0922 0922 0841 0841   Glucose  124 (A)  114 (A)    BUN  15  14    Creatinine  1.07 (A)  0.92    eGFR Non  Am  51 (A)      eGFR African Am  62      Sodium  138  139    Potassium  4.7  4.3    Chloride  107  104    Calcium  9.4  9.4    Total Protein  6.6      Albumin  3.80      Total Bilirubin  0.3      Alkaline Phosphatase  129 (A)      AST (SGOT)  16      ALT (SGPT)  13      WBC 5.73       Hemoglobin 13.4       Hematocrit 40.5       Platelets 223       Total Cholesterol   139     Triglycerides   108     HDL Cholesterol   56     LDL Cholesterol    63     Hemoglobin A1C     5.9 (A)   (A) Abnormal value       Comments are available for some flowsheets but are not being displayed.                     Assessment and Plan    1. PAF (paroxysmal atrial fibrillation) (HCC)  Stable    2. Atrial flutter with controlled response (HCC)  Stable    3. At risk for amiodarone toxicity with long term use  Discontinue amiodarone    4. Current use of long term anticoagulation  Well-tolerated    5. Mitral valve insufficiency, unspecified etiology  Compensated    6. Class 2 obesity   Continue weight reduction    7. Chest pain, atypical  Resolved    8. Orthostatic dizziness  Resolved    Meena overall feels well.  She is free of angina and euvolemic on physical examination.  We will discontinue amiodarone secondary to potential of toxicity.  She will monitor for any significant palpitations.  I will tentatively plan to see her in 6 months sooner  if needed.        Follow Up   No follow-ups on file.  Patient was given instructions and counseling regarding her condition or for health maintenance advice. Please see specific information pulled into the AVS if appropriate.

## 2022-03-24 ENCOUNTER — OFFICE VISIT (OUTPATIENT)
Dept: FAMILY MEDICINE CLINIC | Facility: CLINIC | Age: 67
End: 2022-03-24

## 2022-03-24 ENCOUNTER — OFFICE VISIT (OUTPATIENT)
Dept: CARDIOLOGY | Facility: CLINIC | Age: 67
End: 2022-03-24

## 2022-03-24 VITALS
HEIGHT: 66 IN | DIASTOLIC BLOOD PRESSURE: 72 MMHG | BODY MASS INDEX: 35.36 KG/M2 | HEART RATE: 73 BPM | WEIGHT: 220 LBS | SYSTOLIC BLOOD PRESSURE: 104 MMHG

## 2022-03-24 VITALS
HEIGHT: 66 IN | HEART RATE: 73 BPM | WEIGHT: 220 LBS | SYSTOLIC BLOOD PRESSURE: 104 MMHG | BODY MASS INDEX: 35.36 KG/M2 | DIASTOLIC BLOOD PRESSURE: 72 MMHG

## 2022-03-24 DIAGNOSIS — R42 ORTHOSTATIC DIZZINESS: ICD-10-CM

## 2022-03-24 DIAGNOSIS — R73.01 IFG (IMPAIRED FASTING GLUCOSE): Primary | ICD-10-CM

## 2022-03-24 DIAGNOSIS — I34.0 MITRAL VALVE INSUFFICIENCY, UNSPECIFIED ETIOLOGY: ICD-10-CM

## 2022-03-24 DIAGNOSIS — I48.92 ATRIAL FLUTTER WITH CONTROLLED RESPONSE: ICD-10-CM

## 2022-03-24 DIAGNOSIS — E66.09 CLASS 2 OBESITY DUE TO EXCESS CALORIES WITHOUT SERIOUS COMORBIDITY WITH BODY MASS INDEX (BMI) OF 37.0 TO 37.9 IN ADULT: ICD-10-CM

## 2022-03-24 DIAGNOSIS — Z79.899 AT RISK FOR AMIODARONE TOXICITY WITH LONG TERM USE: ICD-10-CM

## 2022-03-24 DIAGNOSIS — I48.0 PAF (PAROXYSMAL ATRIAL FIBRILLATION): Primary | ICD-10-CM

## 2022-03-24 DIAGNOSIS — Z91.89 AT RISK FOR AMIODARONE TOXICITY WITH LONG TERM USE: ICD-10-CM

## 2022-03-24 DIAGNOSIS — R07.89 CHEST PAIN, ATYPICAL: ICD-10-CM

## 2022-03-24 DIAGNOSIS — Z79.01 CURRENT USE OF LONG TERM ANTICOAGULATION: ICD-10-CM

## 2022-03-24 PROCEDURE — 99213 OFFICE O/P EST LOW 20 MIN: CPT | Performed by: NURSE PRACTITIONER

## 2022-03-24 PROCEDURE — 99214 OFFICE O/P EST MOD 30 MIN: CPT | Performed by: INTERNAL MEDICINE

## 2022-03-24 RX ORDER — MAGNESIUM OXIDE 400 MG/1
1 TABLET ORAL NIGHTLY PRN
COMMUNITY
Start: 2022-01-25

## 2022-03-24 NOTE — PROGRESS NOTES
"Chief Complaint  Impaired Fasting Glucose (& review labs)    Subjective          Meena Cesar presents to [facility]  Ms Cesar presents today to follow up on impaired fasting glucose. Denies chest pain, increased thirst, urination.     I have reviewed the patient's medical history in detail and updated the computerized patient record.    Current Outpatient Medications:   •  albuterol sulfate  (90 Base) MCG/ACT inhaler, 1 (ONE) AEROSOL SOLN TWO PUFFS EVERY 4 HOURS AS NEEDED, Disp: , Rfl:   •  levETIRAcetam (KEPPRA) 500 MG tablet, Take 1,000 mg by mouth Every Evening., Disp: , Rfl: 3  •  magnesium oxide (MAG-OX) 400 MG tablet, Take 1 tablet by mouth At Night As Needed., Disp: , Rfl:   •  omeprazole (priLOSEC) 40 MG capsule, Take 1 capsule by mouth Daily., Disp: 90 capsule, Rfl: 3  •  phenytoin (DILANTIN) 30 MG ER capsule, Take 30 mg by mouth Every Evening., Disp: , Rfl:   •  phenytoin ER (DILANTIN) 100 MG capsule, Take 300 mg by mouth Every Evening., Disp: , Rfl:   •  propranolol (INDERAL) 40 MG tablet, Take 0.5 tablets by mouth 2 (Two) Times a Day., Disp: 90 tablet, Rfl: 3  •  SYMBICORT 160-4.5 MCG/ACT inhaler, Inhale 2 puffs As Needed., Disp: , Rfl:   •  warfarin (COUMADIN) 5 MG tablet, Take 1 tablet by mouth Daily., Disp: 90 tablet, Rfl: 0       Objective   Vital Signs:   /72 (BP Location: Left arm, Patient Position: Sitting, Cuff Size: Adult)   Pulse 73   Ht 167.6 cm (66\")   Wt 99.8 kg (220 lb)   BMI 35.51 kg/m²            Physical Exam  Constitutional:       Appearance: Normal appearance. She is obese.   Cardiovascular:      Rate and Rhythm: Normal rate and regular rhythm.      Pulses: Normal pulses.      Heart sounds: Normal heart sounds.   Pulmonary:      Effort: Pulmonary effort is normal.      Breath sounds: Normal breath sounds.   Skin:     General: Skin is warm and dry.   Neurological:      Mental Status: She is alert and oriented to person, place, " and time.   Psychiatric:         Mood and Affect: Mood normal.         Behavior: Behavior normal.         Thought Content: Thought content normal.         Judgment: Judgment normal.        Result Review :     BMP    BMP 9/16/21 3/17/22   BUN 15 14   Creatinine 1.07 (A) 0.92   Sodium 138 139   Potassium 4.7 4.3   Chloride 107 104   CO2 23.2 20   Calcium 9.4 9.4   (A) Abnormal value            Most Recent A1C    HGBA1C Most Recent 3/17/22   Hemoglobin A1C 5.9 (A)   (A) Abnormal value       Comments are available for some flowsheets but are not being displayed.                     Assessment and Plan    Diagnoses and all orders for this visit:    1. IFG (impaired fasting glucose) (Primary)    Ms. Cesar appears to be doing well today.  I have reviewed her labs with her today.   Her fasting glucose level is improving but still not at goal. Her A1 C is 5.9 which puts her at risk for diabetes. We discussed healthy behaviors to help her managed her glucose levels.    Follow Up   Return for Fasting labs 1 week prior to next f/u, Annual physical.  Patient was given instructions and counseling regarding her condition or for health maintenance advice. Please see specific information pulled into the AVS if appropriate.

## 2022-04-06 ENCOUNTER — ANTICOAGULATION VISIT (OUTPATIENT)
Dept: CARDIOLOGY | Facility: CLINIC | Age: 67
End: 2022-04-06

## 2022-04-06 DIAGNOSIS — I48.0 PAF (PAROXYSMAL ATRIAL FIBRILLATION): Primary | ICD-10-CM

## 2022-04-06 LAB — INR PPP: 2.8 (ref 2–3)

## 2022-04-20 ENCOUNTER — ANTICOAGULATION VISIT (OUTPATIENT)
Dept: CARDIOLOGY | Facility: CLINIC | Age: 67
End: 2022-04-20

## 2022-04-20 DIAGNOSIS — I48.0 PAF (PAROXYSMAL ATRIAL FIBRILLATION): Primary | ICD-10-CM

## 2022-04-20 LAB — INR PPP: 3.7 (ref 2–3)

## 2022-04-27 LAB — INR PPP: 3.1

## 2022-04-28 ENCOUNTER — ANTICOAGULATION VISIT (OUTPATIENT)
Dept: CARDIOLOGY | Facility: CLINIC | Age: 67
End: 2022-04-28

## 2022-04-28 DIAGNOSIS — I48.0 PAF (PAROXYSMAL ATRIAL FIBRILLATION): Primary | ICD-10-CM

## 2022-05-04 ENCOUNTER — ANTICOAGULATION VISIT (OUTPATIENT)
Dept: CARDIOLOGY | Facility: CLINIC | Age: 67
End: 2022-05-04

## 2022-05-04 DIAGNOSIS — I48.0 PAF (PAROXYSMAL ATRIAL FIBRILLATION): Primary | ICD-10-CM

## 2022-05-04 LAB — INR PPP: 2.9 (ref 2–3)

## 2022-05-18 ENCOUNTER — ANTICOAGULATION VISIT (OUTPATIENT)
Dept: CARDIOLOGY | Facility: CLINIC | Age: 67
End: 2022-05-18

## 2022-05-18 DIAGNOSIS — I48.0 PAF (PAROXYSMAL ATRIAL FIBRILLATION): Primary | ICD-10-CM

## 2022-05-18 LAB — INR PPP: 3 (ref 2–3)

## 2022-06-01 ENCOUNTER — ANTICOAGULATION VISIT (OUTPATIENT)
Dept: CARDIOLOGY | Facility: CLINIC | Age: 67
End: 2022-06-01

## 2022-06-01 DIAGNOSIS — I48.0 PAF (PAROXYSMAL ATRIAL FIBRILLATION): Primary | ICD-10-CM

## 2022-06-01 LAB — INR PPP: 3.2 (ref 2–3)

## 2022-06-13 RX ORDER — WARFARIN SODIUM 5 MG/1
TABLET ORAL
Qty: 90 TABLET | Refills: 2 | Status: SHIPPED | OUTPATIENT
Start: 2022-06-13 | End: 2022-09-29

## 2022-06-15 ENCOUNTER — ANTICOAGULATION VISIT (OUTPATIENT)
Dept: CARDIOLOGY | Facility: CLINIC | Age: 67
End: 2022-06-15

## 2022-06-15 DIAGNOSIS — I48.0 PAF (PAROXYSMAL ATRIAL FIBRILLATION): Primary | ICD-10-CM

## 2022-06-15 LAB — INR PPP: 4.3 (ref 2–3)

## 2022-06-15 RX ORDER — WARFARIN SODIUM 2.5 MG/1
2.5 TABLET ORAL DAILY
Qty: 90 TABLET | Refills: 0 | Status: SHIPPED | OUTPATIENT
Start: 2022-06-15 | End: 2022-09-29

## 2022-06-22 ENCOUNTER — ANTICOAGULATION VISIT (OUTPATIENT)
Dept: CARDIOLOGY | Facility: CLINIC | Age: 67
End: 2022-06-22

## 2022-06-22 DIAGNOSIS — I48.0 PAF (PAROXYSMAL ATRIAL FIBRILLATION): Primary | ICD-10-CM

## 2022-06-22 LAB — INR PPP: 2.5

## 2022-06-29 ENCOUNTER — ANTICOAGULATION VISIT (OUTPATIENT)
Dept: CARDIOLOGY | Facility: CLINIC | Age: 67
End: 2022-06-29

## 2022-06-29 DIAGNOSIS — I48.92 ATRIAL FLUTTER WITH CONTROLLED RESPONSE: Primary | ICD-10-CM

## 2022-06-29 DIAGNOSIS — I48.0 PAF (PAROXYSMAL ATRIAL FIBRILLATION): ICD-10-CM

## 2022-06-29 DIAGNOSIS — I48.0 PAF (PAROXYSMAL ATRIAL FIBRILLATION): Primary | ICD-10-CM

## 2022-06-29 LAB — INR PPP: 3.7 (ref 2–3)

## 2022-06-29 RX ORDER — WARFARIN SODIUM 2.5 MG/1
2.5 TABLET ORAL DAILY
Qty: 60 TABLET | Refills: 11 | OUTPATIENT
Start: 2022-06-29

## 2022-06-29 RX ORDER — WARFARIN SODIUM 2 MG/1
2 TABLET ORAL NIGHTLY
Qty: 90 TABLET | Refills: 0 | Status: SHIPPED | OUTPATIENT
Start: 2022-06-29 | End: 2022-09-26

## 2022-07-06 ENCOUNTER — ANTICOAGULATION VISIT (OUTPATIENT)
Dept: CARDIOLOGY | Facility: CLINIC | Age: 67
End: 2022-07-06

## 2022-07-06 DIAGNOSIS — I48.0 PAF (PAROXYSMAL ATRIAL FIBRILLATION): Primary | ICD-10-CM

## 2022-07-06 LAB — INR PPP: 4.6 (ref 2–3)

## 2022-07-13 ENCOUNTER — ANTICOAGULATION VISIT (OUTPATIENT)
Dept: CARDIOLOGY | Facility: CLINIC | Age: 67
End: 2022-07-13

## 2022-07-13 ENCOUNTER — TELEPHONE (OUTPATIENT)
Dept: CARDIOLOGY | Facility: CLINIC | Age: 67
End: 2022-07-13

## 2022-07-13 DIAGNOSIS — I48.0 PAF (PAROXYSMAL ATRIAL FIBRILLATION): Primary | ICD-10-CM

## 2022-07-13 LAB — INR PPP: 1.9 (ref 2–3)

## 2022-07-20 ENCOUNTER — ANTICOAGULATION VISIT (OUTPATIENT)
Dept: CARDIOLOGY | Facility: CLINIC | Age: 67
End: 2022-07-20

## 2022-07-20 DIAGNOSIS — I48.0 PAF (PAROXYSMAL ATRIAL FIBRILLATION): Primary | ICD-10-CM

## 2022-07-20 LAB — INR PPP: 1.7 (ref 2–3)

## 2022-07-27 ENCOUNTER — ANTICOAGULATION VISIT (OUTPATIENT)
Dept: CARDIOLOGY | Facility: CLINIC | Age: 67
End: 2022-07-27

## 2022-07-27 DIAGNOSIS — I48.0 PAF (PAROXYSMAL ATRIAL FIBRILLATION): Primary | ICD-10-CM

## 2022-07-27 LAB — INR PPP: 2.5 (ref 2–3)

## 2022-08-03 ENCOUNTER — ANTICOAGULATION VISIT (OUTPATIENT)
Dept: CARDIOLOGY | Facility: CLINIC | Age: 67
End: 2022-08-03

## 2022-08-03 DIAGNOSIS — I48.0 PAF (PAROXYSMAL ATRIAL FIBRILLATION): Primary | ICD-10-CM

## 2022-08-03 LAB — INR PPP: 2.6

## 2022-08-10 ENCOUNTER — ANTICOAGULATION VISIT (OUTPATIENT)
Dept: CARDIOLOGY | Facility: CLINIC | Age: 67
End: 2022-08-10

## 2022-08-10 DIAGNOSIS — I48.0 PAF (PAROXYSMAL ATRIAL FIBRILLATION): Primary | ICD-10-CM

## 2022-08-10 LAB — INR PPP: 2.2 (ref 2–3)

## 2022-08-24 ENCOUNTER — ANTICOAGULATION VISIT (OUTPATIENT)
Dept: CARDIOLOGY | Facility: CLINIC | Age: 67
End: 2022-08-24

## 2022-08-24 DIAGNOSIS — I48.0 PAF (PAROXYSMAL ATRIAL FIBRILLATION): Primary | ICD-10-CM

## 2022-08-24 LAB — INR PPP: 1.8 (ref 2–3)

## 2022-08-29 ENCOUNTER — TELEPHONE (OUTPATIENT)
Dept: CARDIOLOGY | Facility: CLINIC | Age: 67
End: 2022-08-29

## 2022-08-29 NOTE — TELEPHONE ENCOUNTER
Caller: Meena Cesar    Relationship: Self    Best call back number: [phone]    What is the best time to reach you: ANY    Who are you requesting to speak with (clinical staff, provider,  specific staff member): ANY      What was the call regarding: PT WOULD LIKE TO KNOW IF DR. THOMAS WOULD RECOMMEND A NEW PROVIDER ONCE HE LEAVES.     Do you require a callback: YES

## 2022-08-30 ENCOUNTER — TELEPHONE (OUTPATIENT)
Dept: CARDIOLOGY | Facility: CLINIC | Age: 67
End: 2022-08-30

## 2022-08-30 NOTE — TELEPHONE ENCOUNTER
Caller: Meena Cesar    Relationship: Self    Best call back number: [phone]     What is the best time to reach you: ANY    Who are you requesting to speak with (clinical staff, provider,  specific staff member): ANY    What was the call regarding: PT WANTS TO KNOW WHO IS REPLACING DR. FORD, WHERE THEY WILL BE LOCATED, IF THEY WILL BE IN [place], WHICH PHYSICIANS DO THEY RECOMMEND TO SEE IN [place].     Do you require a callback: YES

## 2022-09-07 ENCOUNTER — ANTICOAGULATION VISIT (OUTPATIENT)
Dept: CARDIOLOGY | Facility: CLINIC | Age: 67
End: 2022-09-07

## 2022-09-07 ENCOUNTER — TELEPHONE (OUTPATIENT)
Dept: CARDIOLOGY | Facility: CLINIC | Age: 67
End: 2022-09-07

## 2022-09-07 DIAGNOSIS — I48.0 PAF (PAROXYSMAL ATRIAL FIBRILLATION): Primary | ICD-10-CM

## 2022-09-07 LAB — INR PPP: 2.9 (ref 2–3)

## 2022-09-07 NOTE — TELEPHONE ENCOUNTER
Caller: Meena Cesar    Relationship: Self    Best call back number: [phone]    What is the best time to reach you: ANY    Who are you requesting to speak with (clinical staff, provider,  specific staff member): ANY        What was the call regarding: PATIENT CALLING TO GIVE INR REPORT: 9.7.22 - INR 2.9. PATIENT WOULD LIKE A CALL BACK AND VOICEMAIL IF THERE IS A CHANGE IN MEDICATION.

## 2022-09-19 DIAGNOSIS — R73.01 IFG (IMPAIRED FASTING GLUCOSE): Primary | ICD-10-CM

## 2022-09-19 DIAGNOSIS — I51.9 HEART DISEASE: ICD-10-CM

## 2022-09-21 ENCOUNTER — ANTICOAGULATION VISIT (OUTPATIENT)
Dept: CARDIOLOGY | Facility: CLINIC | Age: 67
End: 2022-09-21

## 2022-09-21 DIAGNOSIS — I48.0 PAF (PAROXYSMAL ATRIAL FIBRILLATION): Primary | ICD-10-CM

## 2022-09-21 LAB — INR PPP: 2.2 (ref 2–3)

## 2022-09-21 RX ORDER — OMEPRAZOLE 40 MG/1
CAPSULE, DELAYED RELEASE ORAL
Qty: 90 CAPSULE | Refills: 3 | Status: SHIPPED | OUTPATIENT
Start: 2022-09-21

## 2022-09-22 LAB
ALBUMIN SERPL-MCNC: 3.4 G/DL (ref 3.5–5.2)
ALBUMIN/GLOB SERPL: 1.2 G/DL
ALP SERPL-CCNC: 128 U/L (ref 39–117)
ALT SERPL-CCNC: 10 U/L (ref 1–33)
AST SERPL-CCNC: 16 U/L (ref 1–32)
BILIRUB SERPL-MCNC: 0.3 MG/DL (ref 0–1.2)
BUN SERPL-MCNC: 9 MG/DL (ref 8–23)
BUN/CREAT SERPL: 11.1 (ref 7–25)
CALCIUM SERPL-MCNC: 9.1 MG/DL (ref 8.6–10.5)
CHLORIDE SERPL-SCNC: 106 MMOL/L (ref 98–107)
CHOLEST SERPL-MCNC: 120 MG/DL (ref 0–200)
CHOLEST/HDLC SERPL: 2.31 {RATIO}
CO2 SERPL-SCNC: 23 MMOL/L (ref 22–29)
CREAT SERPL-MCNC: 0.81 MG/DL (ref 0.57–1)
EGFRCR SERPLBLD CKD-EPI 2021: 78.7 ML/MIN/1.73
GLOBULIN SER CALC-MCNC: 2.8 GM/DL
GLUCOSE SERPL-MCNC: 123 MG/DL (ref 65–99)
HDLC SERPL-MCNC: 52 MG/DL (ref 40–60)
LDLC SERPL CALC-MCNC: 52 MG/DL (ref 0–100)
POTASSIUM SERPL-SCNC: 4.1 MMOL/L (ref 3.5–5.2)
PROT SERPL-MCNC: 6.2 G/DL (ref 6–8.5)
SODIUM SERPL-SCNC: 139 MMOL/L (ref 136–145)
TRIGL SERPL-MCNC: 81 MG/DL (ref 0–150)
VLDLC SERPL CALC-MCNC: 16 MG/DL (ref 5–40)

## 2022-09-24 DIAGNOSIS — I48.0 PAF (PAROXYSMAL ATRIAL FIBRILLATION): ICD-10-CM

## 2022-09-26 RX ORDER — WARFARIN SODIUM 2 MG/1
TABLET ORAL
Qty: 90 TABLET | Refills: 0 | Status: SHIPPED | OUTPATIENT
Start: 2022-09-26 | End: 2022-10-19 | Stop reason: SDUPTHER

## 2022-09-28 NOTE — PROGRESS NOTES
"Chief Complaint  Atrial Fibrillation    Subjective    History of Present Illness      I saw Meena Cesar today for cardiovascular care.  She is a very pleasant 67-year-old female she is free of chest pain pressure or tightness.  She does not report any significant or progressive dyspnea on exertion.  She denies orthopnea or PND and no lower extremity edema.  She has some symptoms suggestive of vertigo but no syncope or palpitations.  She continues to tolerate her medical regimen well.  She has a history of atrial fibrillation remains on long-term anticoagulation.  She does not report any significant bruising or bleeding.    Objective   Vital Signs:   /70   Pulse 77   Ht 167.6 cm (66\")   Wt 88.5 kg (195 lb)   BMI 31.47 kg/m²     Constitutional:       Appearance: Well-developed.   Eyes:      Conjunctiva/sclera: Conjunctivae normal.      Pupils: Pupils are equal, round, and reactive to light.   HENT:      Head: Normocephalic and atraumatic.   Neck:      Thyroid: No thyromegaly.   Pulmonary:      Effort: Pulmonary effort is normal. No respiratory distress.      Breath sounds: Normal breath sounds. No wheezing. No rales.   Cardiovascular:      Normal rate. Regular rhythm.      No gallop. No S3 and S4 gallop. No rub.   Edema:     Peripheral edema absent.   Abdominal:      General: Bowel sounds are normal. There is no distension.      Palpations: Abdomen is soft. There is no abdominal mass.      Tenderness: There is no abdominal tenderness.   Musculoskeletal: Normal range of motion.      Cervical back: Neck supple. Skin:     General: Skin is warm and dry.      Findings: No erythema.   Neurological:      Mental Status: Alert and oriented to person, place, and time.         Result Review :     Common labs    Common Labs 3/17/22 3/17/22 9/22/22 9/22/22    0841 0841 0841 0841   Glucose 114 (A)  123 (A)    BUN 14  9    Creatinine 0.92  0.81    Sodium 139  139    Potassium 4.3  4.1    Chloride 104  106    Calcium 9.4 "  9.1    Total Protein   6.2    Albumin   3.40 (A)    Total Bilirubin   0.3    Alkaline Phosphatase   128 (A)    AST (SGOT)   16    ALT (SGPT)   10    Total Cholesterol    120   Triglycerides    81   HDL Cholesterol    52   LDL Cholesterol     52   Hemoglobin A1C  5.9 (A)     (A) Abnormal value       Comments are available for some flowsheets but are not being displayed.                     Assessment and Plan    1. Mitral valve insufficiency, unspecified etiology  Stable    2. PAF (paroxysmal atrial fibrillation) ([facility])  Stable    3. Atrial flutter with controlled response ([facility])  Stable    4. Current use of long term anticoagulation  Well-tolerated    5. Palpitations  Stable    Meena feels well and remains active.  She is free of angina and is euvolemic on examination.  She will continue her current medical regimen.  Her lipids remain well controlled.  I will arrange for follow-up in 6 months sooner if needed.        Follow Up   No follow-ups on file.  Patient was given instructions and counseling regarding her condition or for health maintenance advice. Please see specific information pulled into the AVS if appropriate.

## 2022-09-29 ENCOUNTER — OFFICE VISIT (OUTPATIENT)
Dept: CARDIOLOGY | Facility: CLINIC | Age: 67
End: 2022-09-29

## 2022-09-29 VITALS
HEART RATE: 77 BPM | HEIGHT: 66 IN | SYSTOLIC BLOOD PRESSURE: 102 MMHG | BODY MASS INDEX: 31.34 KG/M2 | DIASTOLIC BLOOD PRESSURE: 70 MMHG | WEIGHT: 195 LBS

## 2022-09-29 DIAGNOSIS — R00.2 PALPITATIONS: Chronic | ICD-10-CM

## 2022-09-29 DIAGNOSIS — Z79.899 LONG TERM CURRENT USE OF AMIODARONE: ICD-10-CM

## 2022-09-29 DIAGNOSIS — I48.0 PAF (PAROXYSMAL ATRIAL FIBRILLATION): ICD-10-CM

## 2022-09-29 DIAGNOSIS — Z98.890 HISTORY OF CARDIOVERSION: ICD-10-CM

## 2022-09-29 DIAGNOSIS — Z79.01 CURRENT USE OF LONG TERM ANTICOAGULATION: Chronic | ICD-10-CM

## 2022-09-29 DIAGNOSIS — Z79.899 AT RISK FOR AMIODARONE TOXICITY WITH LONG TERM USE: Chronic | ICD-10-CM

## 2022-09-29 DIAGNOSIS — I34.0 MITRAL VALVE INSUFFICIENCY, UNSPECIFIED ETIOLOGY: Primary | Chronic | ICD-10-CM

## 2022-09-29 DIAGNOSIS — I48.92 ATRIAL FLUTTER WITH CONTROLLED RESPONSE: Chronic | ICD-10-CM

## 2022-09-29 DIAGNOSIS — Z91.89 AT RISK FOR AMIODARONE TOXICITY WITH LONG TERM USE: Chronic | ICD-10-CM

## 2022-09-29 PROCEDURE — 99214 OFFICE O/P EST MOD 30 MIN: CPT | Performed by: INTERNAL MEDICINE

## 2022-09-29 RX ORDER — ASPIRIN 81 MG/1
81 TABLET ORAL DAILY
COMMUNITY

## 2022-10-05 ENCOUNTER — ANTICOAGULATION VISIT (OUTPATIENT)
Dept: CARDIOLOGY | Facility: CLINIC | Age: 67
End: 2022-10-05

## 2022-10-05 DIAGNOSIS — I48.0 PAF (PAROXYSMAL ATRIAL FIBRILLATION): Primary | ICD-10-CM

## 2022-10-05 LAB — INR PPP: 2.3

## 2022-10-19 ENCOUNTER — ANTICOAGULATION VISIT (OUTPATIENT)
Dept: CARDIOLOGY | Facility: CLINIC | Age: 67
End: 2022-10-19

## 2022-10-19 DIAGNOSIS — I48.0 PAF (PAROXYSMAL ATRIAL FIBRILLATION): Primary | ICD-10-CM

## 2022-10-19 DIAGNOSIS — I48.0 PAF (PAROXYSMAL ATRIAL FIBRILLATION): ICD-10-CM

## 2022-10-19 LAB — INR PPP: 2.8

## 2022-10-19 RX ORDER — PROPRANOLOL HYDROCHLORIDE 40 MG/1
20 TABLET ORAL 2 TIMES DAILY
Qty: 90 TABLET | Refills: 3 | Status: SHIPPED | OUTPATIENT
Start: 2022-10-19

## 2022-10-19 RX ORDER — WARFARIN SODIUM 2 MG/1
2 TABLET ORAL
Qty: 90 TABLET | Refills: 3 | Status: SHIPPED | OUTPATIENT
Start: 2022-10-19

## 2022-11-09 ENCOUNTER — TELEPHONE (OUTPATIENT)
Dept: CARDIOLOGY | Facility: CLINIC | Age: 67
End: 2022-11-09

## 2022-11-09 NOTE — TELEPHONE ENCOUNTER
Caller: Meena Cesar    Relationship to patient: Self    Best call back number: [phone]    Patient is needing: I CALLED THE OFFICE TO SEE WHERE TO DOCUMENT THE PATIENTS INR INFORMATION. SHE WAS A PATIENT OF DR THOMAS AND IS SUPPOSE TO REPORT HER INR. I WAS GIVEN THE MED CLINIC NUMBER. I CALLED THEM AND SHE IS NOT A PATIENT OF THEIRS SO WITHOUT A REFERRAL THEY WILL NOT TAKE THE INFORMATION.     I WANTED TO GET THIS DOCUMENTED.     PT INR THIS MORNING WAS 1.9.  SHE TAKES 2MG ON M-W-F,  THEN SHE TAKES 1MG ON  T-TH-SAT- SUN

## 2022-12-09 NOTE — TELEPHONE ENCOUNTER
Shakira can you please contact pt and get her scheduled with our office so that we can move forward with her INR testing through our med management     Please send back to me once appt is scheduled    Thank You :)

## 2022-12-28 ENCOUNTER — TELEPHONE (OUTPATIENT)
Dept: CARDIOLOGY | Facility: CLINIC | Age: 67
End: 2022-12-28
Payer: COMMERCIAL

## 2022-12-28 NOTE — TELEPHONE ENCOUNTER
Dr. Ajay Hathaway pt    Received a renewal form from [employer] on pts INR checks    Who would you like for pt to get in to see so that we can get the form completed under their name for the future   Etiology for patient's reported 100-lb weight loss is not clear.  Unlikely to be secondary to colonic malignancy given that he had a colonoscopy in January 2020.  With his complaints of poor appetite and dysphagia, performing an EGD is not unreasonable.  Patient undergoing [facility] this morning.  Will try to coordinate with cardiac anesthesia and interventional cardiology.

## 2022-12-28 NOTE — TELEPHONE ENCOUNTER
Can you please contact pt and get them scheduled with Dr. Pritchett if she is accepting new pts that are not cardio oncology if not then any provider will be fine and then forward back to me so I can complete paperwork    Thank You :)

## 2023-01-04 NOTE — TELEPHONE ENCOUNTER
I called and spoke with patient. She had quite a few questions about what she would need to be doing. I advised that we need to have her scheduled to establish care with one of our cardiologists so we can monitor INR checks. She would probably like to see one of the doctors at our   [facility] office but would like to think it over before setting an appointment.    Shakira

## 2023-01-04 NOTE — TELEPHONE ENCOUNTER
Shakira can you please help with some of these Dr. Hathaway pts     We need to get them scheduled with a provider so we can get their INRs redirected    Thank You :)

## 2023-05-04 ENCOUNTER — TELEPHONE (OUTPATIENT)
Dept: FAMILY MEDICINE CLINIC | Facility: CLINIC | Age: 68
End: 2023-05-04
Payer: COMMERCIAL

## 2023-05-04 NOTE — TELEPHONE ENCOUNTER
Scheduled Physical on 5-19-23 need lab orders.  Patient has been dieting and has lost quite a bit of weight -- weighs 185 now.

## 2023-05-08 DIAGNOSIS — I51.9 HEART DISEASE: ICD-10-CM

## 2023-05-08 DIAGNOSIS — N28.9 RENAL INSUFFICIENCY: ICD-10-CM

## 2023-05-08 DIAGNOSIS — R73.01 IFG (IMPAIRED FASTING GLUCOSE): Primary | ICD-10-CM

## 2023-05-14 LAB
ALBUMIN SERPL-MCNC: 3.3 G/DL (ref 3.8–4.8)
ALBUMIN/GLOB SERPL: 1.2 {RATIO} (ref 1.2–2.2)
ALP SERPL-CCNC: 119 IU/L (ref 44–121)
ALT SERPL-CCNC: 10 IU/L (ref 0–32)
AST SERPL-CCNC: 17 IU/L (ref 0–40)
BILIRUB SERPL-MCNC: <0.2 MG/DL (ref 0–1.2)
BUN SERPL-MCNC: 12 MG/DL (ref 8–27)
BUN/CREAT SERPL: 14 (ref 12–28)
CALCIUM SERPL-MCNC: 9.1 MG/DL (ref 8.7–10.3)
CHLORIDE SERPL-SCNC: 106 MMOL/L (ref 96–106)
CHOLEST SERPL-MCNC: 107 MG/DL (ref 100–199)
CHOLEST/HDLC SERPL: 2.2 RATIO (ref 0–4.4)
CO2 SERPL-SCNC: 21 MMOL/L (ref 20–29)
CREAT SERPL-MCNC: 0.88 MG/DL (ref 0.57–1)
EGFRCR SERPLBLD CKD-EPI 2021: 71 ML/MIN/1.73
GLOBULIN SER CALC-MCNC: 2.7 G/DL (ref 1.5–4.5)
GLUCOSE SERPL-MCNC: 110 MG/DL (ref 70–99)
HDLC SERPL-MCNC: 49 MG/DL
LDLC SERPL CALC-MCNC: 41 MG/DL (ref 0–99)
MAGNESIUM SERPL-MCNC: 2.2 MG/DL (ref 1.6–2.3)
POTASSIUM SERPL-SCNC: 4.7 MMOL/L (ref 3.5–5.2)
PROT SERPL-MCNC: 6 G/DL (ref 6–8.5)
SODIUM SERPL-SCNC: 141 MMOL/L (ref 134–144)
TRIGL SERPL-MCNC: 84 MG/DL (ref 0–149)
VLDLC SERPL CALC-MCNC: 17 MG/DL (ref 5–40)

## 2023-07-24 RX ORDER — OMEPRAZOLE 40 MG/1
CAPSULE, DELAYED RELEASE ORAL
Qty: 90 CAPSULE | Refills: 3 | OUTPATIENT
Start: 2023-07-24

## 2023-10-23 RX ORDER — OMEPRAZOLE 40 MG/1
CAPSULE, DELAYED RELEASE ORAL
Qty: 90 CAPSULE | Refills: 3 | OUTPATIENT
Start: 2023-10-23